# Patient Record
Sex: FEMALE | Race: WHITE
[De-identification: names, ages, dates, MRNs, and addresses within clinical notes are randomized per-mention and may not be internally consistent; named-entity substitution may affect disease eponyms.]

---

## 2017-06-02 ENCOUNTER — HOSPITAL ENCOUNTER (OUTPATIENT)
Dept: HOSPITAL 53 - M PAIN | Age: 62
End: 2017-06-02
Attending: ANESTHESIOLOGY
Payer: COMMERCIAL

## 2017-06-02 DIAGNOSIS — Z88.2: ICD-10-CM

## 2017-06-02 DIAGNOSIS — Z88.1: ICD-10-CM

## 2017-06-02 DIAGNOSIS — Z91.040: ICD-10-CM

## 2017-06-02 DIAGNOSIS — L23.0: ICD-10-CM

## 2017-06-02 DIAGNOSIS — Z88.0: ICD-10-CM

## 2017-06-02 DIAGNOSIS — J45.909: ICD-10-CM

## 2017-06-02 DIAGNOSIS — Z88.8: ICD-10-CM

## 2017-06-02 DIAGNOSIS — Z79.891: ICD-10-CM

## 2017-06-02 DIAGNOSIS — M47.816: ICD-10-CM

## 2017-06-02 DIAGNOSIS — E11.9: ICD-10-CM

## 2017-06-02 DIAGNOSIS — M19.90: ICD-10-CM

## 2017-06-02 DIAGNOSIS — Z91.018: ICD-10-CM

## 2017-06-02 DIAGNOSIS — G89.29: Primary | ICD-10-CM

## 2017-06-02 DIAGNOSIS — I10: ICD-10-CM

## 2017-06-02 DIAGNOSIS — Z79.899: ICD-10-CM

## 2017-06-02 DIAGNOSIS — M47.817: ICD-10-CM

## 2017-06-02 PROCEDURE — 64493 INJ PARAVERT F JNT L/S 1 LEV: CPT

## 2017-06-02 PROCEDURE — 64494 INJ PARAVERT F JNT L/S 2 LEV: CPT

## 2017-06-02 NOTE — REP
C-ARM VIEWS OF THE LUMBAR SPINE:

 

CLINICAL HISTORY:  Pain.

 

Two C-arm views of the lumbar spine are performed during injection by Dr. Gracia.  [place] are seen along the lower lumbar spine.  19 seconds of

fluoroscopy time is utilized for the procedure.

 

 

Signed by

Shon Kruse MD 06/02/2017 03:59 P

## 2017-06-07 NOTE — ECWPNPC
PATIENT NAME: JERRY MILLER

: 1955

GENDER: FEMALE

MRN: [id number]

VISIT DATE: 2017

DISCHARGE DATE: 17 1133

VISIT LOCKED DATE TIME: 

PHYSICIAN: PEDRO HERNÁNDEZ  

RESOURCE: PEDRO HERNÁNDEZ  

 

           

           

REASON FOR APPOINTMENT

           

          1. LFBD #2

           

HISTORY OF PRESENT ILLNESS

           

      HISTORY OF PRESENT ILLNESS:

      PAIN

           

           

          THE PATIENT DESCRIBES THE PAIN...

           

      FALL RISK SCREENING:

      SCREENING

           

           

          :NO FALLS IN THE PAST YEAR

           

CURRENT MEDICATIONS

           

          TAKING CYMBALTA 30 MG CAPSULE DELAYED RELEASE PARTICLES 1 CAPSULE

          ORALLY FOR PAIN TWICE A DAY MDD2, NOTES: 17

          TAKING GABAPENTIN 300 MG CAPSULE 1 CAPSULE ORALLY SPECIAL FUNDS

          TWO TIMES A DAY FOR PAINMDD2, NOTES: 17

          TAKING SKELAXIN 800 MG TABLET 1 TABLET ORALLY BID PRN, NOTES:

          900

          TAKING ALLOPURINOL 300 MG TABLET 1 TABLET ORALLY ONCE A DAY,

          NOTES: 17

          TAKING SINGULAIR 10 MG TABLET 1 TABLET IN THE EVENING ORALLY ONCE

          A DAY, NOTES: 17

          TAKING BENTYL 20 MG TABLET 1 TABLET ORALLY EVERY 4 HOURS AS

          NEEDED, NOTES: 17

          TAKING VENTOLIN  (90 BASE) MCG/ACT AEROSOL SOLUTION 2

          PUFFS INHALATION AS NEEDED, NOTES: 2100

          TAKING VITAMIN B COMPLEX CAPSULE AS DIRECTED ORALLY DAILY, NOTES:

          17

          TAKING HYDROCHLOROTHIAZIDE 25 25 MG TABLET AS DIRECTED ORAL

          DAILY, NOTES: 17

          TAKING LATANOPROST 0.005 % SOLUTION 1 DROP INTO BOTH EYES EVENING

          OPHTHALMIC ONCE A DAY, NOTES: 17

          TAKING SYMBICORT 80-4.5 MCG/ACT AEROSOL 2 PUFFS INHALATION TWICE

          A DAY, NOTES: 17

          TAKING PREMARIN 0.625 MG/GM CREAM VAGINAL THREE TIMES A WEEK,

          NOTES: 17

          TAKING TYLENOL 500 MG TABLET 1 TABLET AS NEEDED ORALLY EVERY 6

          HRS, NOTES: 17

          TAKING BENTYL 10 MG CAPSULE 1 CAPSULE ORALLY TWO TIMES A DAY,

          NOTES: 5-15-17 2200

          TAKING REFRESH CELLUVISC 1 % SOLUTION 1 DROP OU OPHTHALMIC 24

          TIME(S) A DAY, NOTES: 17

          TAKING DIFLUCAN 200 MG TABLET 1 TABLET ORALLY AS DIRECTED, NOTES:

          5-15-17 2200

          TAKING FIBER 58.6 % POWDER 2 CAPSULES WITH 8 OUNCES OF LIQUID

          ORALLY DAILY, NOTES: 17

          TAKING CRANBERRY 405 MG CAPSULE ORALLY DAILY, NOTES: 17

          TAKING TRAMADOL HCL 50 MG TABLET 1 TABLET AS NEEDED ORALLY Q8H

          PRN MDD3, NOTES: 17

          TAKING FLONASE ALLERGY RELIEF 50 MCG/ACT SUSPENSION 1 SPRAY IN

          EACH NOSTRIL NASALLY ONCE A DAY, NOTES: 17

          TAKING OXYBUTYNIN CHLORIDE 5 MG TABLET 2 TABLETS ORALLY 2 TIMES A

          DAY, NOTES: 17

          TAKING ALPHA LIPOIC ACID 100 MG CAPSULE 1 CAP ORALLY DAILY,

          NOTES: 17

          TAKING POTASSIUM CHLORIDE 10 MEQ CAPSULE EXTENDED RELEASE 1

          CAPSULE WITH FOOD ORALLY ONCE A DAY, NOTES: 17

          TAKING PROBIOTIC - CAPSULE ORALLY DAILY, NOTES: 17

          TAKING OMEPRAZOLE 20 MG CAPSULE DELAYED RELEASE 1 CAP ORALLY ONCE

          A DAY, NOTES: 17

          TAKING ALLERX 1 DROP OU BID, NOTES: 17

          NOT-TAKING XIIDRA 5 % SOLUTION 1 DROP INTO AFFECTED EYE

          OPHTHALMIC TWICE A DAY

          NOT-TAKING CABERGOLINE 0.5 MG TABLET 0.5 TABLET ORALLY TWICE A

          WEEK (TAKES 0.05 MG 1/2 TABLET), NOTES: MEDICATION DISCONTINUED

          NOT-TAKING MONOVISC 88 MG/4ML SOLUTION PREFILLED SYRINGE

          INTRA-ARTICULAR EVERY 6 MONTHS, NOTES: EVERY 6 MO (LAST 16)

          NOT-TAKING RHINOCORT AQUA 32 MCG/ACT SUSPENSION 1 SPRAY IN EACH

          NOSTRIL NASALLY AS NEEDED

          NOT-TAKING OXYBUTYNIN CHLORIDE ER 10 MG TABLET EXTENDED RELEASE

          24 HOUR 1 TABLET ORALLY BID

          NOT-TAKING ULTRAM 50 MG TABLET 1 TABLET AS NEEDED FOR PAIN ORALLY

          EVERY 6 HRS MDD1

          NOT-TAKING NASACORT ALLERGY 24HR 55 MCG/ACT AEROSOL 1 PUFF IN

          EACH NOSTRIL NASALLY ONCE A DAY

          NOT-TAKING CELEBREX 100 MG CAPSULE 1 CAPSULE ORALLY TWICE A DAY

          NOT-TAKING VITAMIN C 500 MG TABLET AS DIRECTED ORALLY DAILY,

          NOTES: 16 0900

          MEDICATION LIST REVIEWED AND RECONCILED WITH THE PATIENT

           

PAST MEDICAL HISTORY

           

          DIABETES

          BLOOD PRESSURE

          ASTHMA

          PITUITARY ADENOMA

          ARTHRITIS

          RUPTURED DISC/BACK PAIN

          IBS WITH DIARRHEA

          OPTIC DAMAGE DUE TO HYPERTENSION

          CHRONIC SINUSITIS

           

ALLERGIES

           

          PENICILLIN (FOR ALLERGIES USE ONLY): RASH: ALLERGY

          SULFA (FOR ALLERGY USE ONLY): RASH: ALLERGY

          PHENERGAN: BLOOD CLOT: SIDE EFFECTS

          LATEX CONDOMS, RUBBER BANDS, GLOVES: RASH: ALLERGY

          ERYTHROMYCIN: RASH: ALLERGY

          NICLKEL: RASH: ALLERGY

          SULFITES: DYSPNEA: ALLERGY

          WHEAT: GI UPSET, WATERY EYES AND SINUS CONGERSTION: SIDE EFFECTS

           

REVIEW OF SYSTEMS

           

      CONSTITUTIONAL:

           

          ANY CHANGE IN YOUR MEDICAL CONDITION? NO . CHILLS NO . FEVER NO .

           

      INFECTION:

           

          DO YOU HAVE NEW INFECTIONS? NO . DO YOU HAVE HISTORY OF MRSA? NO

          .

           

      MUSCULOSKELETAL:

           

          ANY NEW PATTERNS OF PAIN OR NUMBNESS? NO .

           

      GASTROENTEROLOGY:

           

          ANY NEW CHANGE IN BOWEL CONTROL? NO .

           

      GENITOURINARY:

           

          ANY NEW CHANGE IN BLADDER CONTROL? NO . IS THERE A CHANCE YOU

          COULD BE PREGNANT? NO .

           

      HEMATOLOGY/LYMPH:

           

          DO YOU TAKE ANY BLOOD THINNERS? (FOR EXAMPLE- COUMADIN, PLAVIX,

          AGGRENOX, PLATEL, PRADAXA, OR XARELTO) NO . WHEN WAS YOUR LAST

          DOSE? DATE: TIME: .

           

      NEUROLOGY:

           

          HAVE YOU FALLEN IN THE PAST 6 MONTHS? NO . ANY NEW EXTREMITY

          NUMBNESS OR WEAKNESS? NO .

           

      CARDIOLOGY:

           

          DO YOU HAVE A PACEMAKER OR DEFIBRILLATOR? NO .

           

      RESPIRATORY:

           

          HAVE YOU BEEN SICK IN THE PAST WEEK? NO . FEVER NO . FLU LIKE

          SYMPTOMS? NO . COUGH NO .

           

      INTEGUMENTARY:

           

          DO YOU HAVE ANY RASHES OR OPEN SORES? NO .

           

      ALLERGIC/IMMUNO:

           

          ARE YOU ALLERGIC TO SHELLFISH OR IV DYE? NO . ANY NEW ALLERGIES?

          NO .

           

      PSYCHIATRIC:

           

          DO YOU HAVE THOUGHTS OF HURTING YOURSELF OR SOMEONE ELSE? NO .

          ARE YOU ABUSED, NEGLECTED, OR IN AN UNSAFE ENVIRONMENT? NO .

           

      ENDOCRINOLOGY:

           

          ARE YOU DIABETIC? NO .

           

      OTHER:

           

          DO YOU NEED ANY PRESCRIPTIONS? NO . IF YES, PLEASE LIST: ____ .

          ANY NEW PROBLEMS WITH YOUR MEDICATIONS? NO . WHEN DID YOU LAST

          EAT? ____7 PM LAST NIGHT . WHEN DID YOU LAST DRINK? ____0800

          TODAY . WHAT DID YOU LAST DRINK? ____WATER . NAME OF PERSON

          DRIVING YOU HOME? ____MARK . DO YOU HAVE ANY OTHER QUESTIONS OR

          CONCERNS NO .

           

      REVIEWED BY:

           

          PROVIDER: _____ .

           

VITAL SIGNS

           

          .0 LBS, HT 65", BMI 38.27 INDEX, /60 MM HG, HR 69

          /MIN, RR 16 /MIN, TEMP 98.0 F, OXYGEN SAT % 98%, NA INITIALS TL

          0958.

           

ASSESSMENTS

           

          SPONDYLOSIS WITHOUT MYELOPATHY OR RADICULOPATHY, LUMBAR REGION -

          M47.816 (PRIMARY)

           

          SPONDYLOSIS WITHOUT MYELOPATHY OR RADICULOPATHY, LUMBOSACRAL

          REGION - M47.817

           

PROCEDURES

           

      PN WORKMANS' COMP OPINION

          IN YOUR OPINION, WAS THE INCIDENT THAT THE PATIENT DESCRIBED THE

          COMPETENT MEDICAL CAUSE OF THIS INJURY/ILLNESS? YES

          ARE THE PATIENT'S COMPLAINTS CONSISTENT WITH HIS/HER HISTORY OF

          THE INJURY/ILLNESS? YES

          IS THE PATIENT'S HISTORY OF THE INJURY/ILLNESS CONSISTENT WITH

          YOUR OBJECTIVE FINDING? YES

          WHAT IS THE PERCENTAGE OF TEMPORARY IMPAIRMENT? MODERATE TO

          MARKED = 66.7%

          IS THE PATIENT WORKING? YES

          DOCTOR ON SITE: PEDRO MILLER MD

      PN LUMBAR FACET BLOCK DIAGNOSTIC

          PRE PROCEDURE DIAGNOSIS LUMBAR SPONDYLOSIS, LUMBOSACRAL

          SPONDYLOSIS

          POST PROCEDURE DIAGNOSIS LUMBAR SPONDYLOSIS, LUMBOSACRAL

          SPONDYLOSIS

          PROCEDURE RIGHT L4-L5 AND L5-S1 FACET BLOCK DIAGNOSTIC NUMBER #2

          SURGEON DR. PEDRO HERNÁNDEZ

          ASSISTANT NONE

          ANESTHESIA LOCAL

          PRE PROCEDURE NOTE THE PATIENT WITH HISTORY OF CHRONIC LOW BACK

          PAIN. I EVALUATED THE PATIENT AND REVIEWED THE CHART. I WENT OVER

          THE RISKS, ALTERNATIVES, AND BENEFITS ASSOCIATED WITH THIS

          PROCEDURE. THE PATIENT WOULD LIKE TO PROCEED AND GAVE CONSENT TO

          PERFORM THE PROCEDURE. AS AGREED WITH THE PATIENT WE ARE DOING

          THIS PROCEDURE TO DETERMINE IF THE PATIENT IS A CANDIDATE FOR A

          RADIOFREQUENCY ABLATION OF THE FACETS JOINTS. THE PATIENT DENIES

          UNEXPLAINABLE WEIGHT LOSS, FEVER, CHILLS, OR NEW CHANGES IN

          URINARY OR BOWEL CONTROL

          DESCRIPTION OF PROCEDURE THE PATIENT WAS BROUGHT TO THE PROCEDURE

          ROOM AND PLACED IN THE PRONE POSITION. THE LUMBOSACRAL AREA WAS

          CLEANED WITH CHLORAPREP SOLUTION AND DRAPED ASEPTICALLY. THE

          PROCEDURE WAS DONE UNDER STERILE CONDITIONS. I CHECKED LATERALITY

          AND THE LEVEL WHERE THE PROCEDURE WAS GOING TO BE PERFORMED WITH

          THE PATIENT AND THE SUPPORTING STAFF AT THE MOMENT OF THE TIME

          OUT IN THE PROCEDURE ROOM. UNDER FLUOROSCOPIC GUIDANCE, TARGETS

          WERE SELECTED AT THE INTERSECTION OF THE RIGHT TRANSVERSE PROCESS

          OF L4, L5 AND ALA OF S1 WITH ITS RESPECTIVE SUPERIOR ARTICULAR

          PROCESS. LIDOCAINE WAS USED TO NUMB THE SKIN AND THE SUBCUTANEOUS

          TISSUE BELOW IT. SPINAL NEEDLE, 22-GAUGE WAS ADVANCED UNDER

          FLUOROSCOPIC GUIDANCE AND FOLLOWING PATIENT FEEDBACK UNTIL THE

          TARGETS WERE REACHED. POSITION OF THE NEEDLES WAS VERIFIED WITH

          AP AND LATERAL VIEWS. AFTER PROPER POSITION OF THE NEEDLES WAS

          ACHIEVED, ISOVUE-M DYE 30% 0.1 ML WAS INJECTED AT EACH SITE

          SHOWING ADEQUATE SPREAD OF THE DYE. THEN A SOLUTION OF 0.4 ML OF

          BUPIVACAINE 0.25% WAS INJECTED AT EACH SITE. THERE WAS NO

          EVIDENCE OF BLOOD, PARESTHESIA OR CEREBROSPINAL FLUID DURING THE

          PROCEDURE. THE PATIENT WAS SENT TO THE RECOVERY ROOM. THE PATIENT

          WAS MOVING THE EXTREMITIES AND DOING WELL. THERE WAS NO

          COMPLICATION DURING THE PROCEDURE. FLUOROSCOPY TIME WAS 19

          SECONDS

          POST PROCEDURE NOTE THE PATIENT WILL DOCUMENT HIS PAIN LEVEL AND

          RESPONSE TO THIS PROCEDURE EVERY 30 MINUTES. THE PATIENT WILL BE

          SEEN IN A FOLLOW UP IN THE NEXT FEW WEEKS. FURTHER DETERMINATION

          FOR HIS CASE WILL BE DONE AT THE NEXT VISIT. INSTRUCTIONS WERE

          GIVEN, QUESTIONS WERE ANSWERED, AND THE PATIENT EXPRESSED

          UNDERSTANDING AND AGREED WITH THE PLAN. I, SANDY UREÑA,

          DOCUMENTED THE ABOVE INFORMATION ACTING AS A SCRIBE FOR DR. HERNÁNDEZ. I HAVE REVIEWED THE ABOVE DOCUMENT, WRITTEN BY SANDY UREÑA SCRIBE AND I VERIFY THAT IT IS ACCURATE

           

DIAGNOSTIC IMAGING

           

          [facility] FACET BLOCK (PAIN)[id number]

           

PROCEDURE CODES

           

          47793 INJ PARAVERT F JNT L/S 1 LEV

           

          29471 INJ PARAVERT F JNT L/S 2 LEV

           

          6045F RADXPS IN END [id number] PXD

           

DISPOSITION & COMMUNICATION

           

FOLLOW UP

           

          3 WEEKS

           

 

ELECTRONICALLY SIGNED BY PEDRO HERNÁNDEZ MD ON

          2017 AT 06:56 PM EDT

           

           

           

 

DISCLAIMER :

THIS IS A VISIT SUMMARY EXTRACTED FROM THE [employer] CHART.

IT IS NOT A COPY OF THE [employer] PROGRESS NOTE.

MTDD

## 2017-06-16 ENCOUNTER — HOSPITAL ENCOUNTER (OUTPATIENT)
Dept: HOSPITAL 53 - M PAIN | Age: 62
End: 2017-06-16
Attending: ANESTHESIOLOGY
Payer: COMMERCIAL

## 2017-06-16 DIAGNOSIS — M47.817: ICD-10-CM

## 2017-06-16 DIAGNOSIS — M54.5: ICD-10-CM

## 2017-06-16 DIAGNOSIS — Z88.1: ICD-10-CM

## 2017-06-16 DIAGNOSIS — Z79.899: ICD-10-CM

## 2017-06-16 DIAGNOSIS — M19.90: ICD-10-CM

## 2017-06-16 DIAGNOSIS — Z88.0: ICD-10-CM

## 2017-06-16 DIAGNOSIS — Z91.018: ICD-10-CM

## 2017-06-16 DIAGNOSIS — Z88.2: ICD-10-CM

## 2017-06-16 DIAGNOSIS — L23.0: ICD-10-CM

## 2017-06-16 DIAGNOSIS — G89.29: Primary | ICD-10-CM

## 2017-06-16 DIAGNOSIS — E11.9: ICD-10-CM

## 2017-06-16 DIAGNOSIS — J45.909: ICD-10-CM

## 2017-06-16 DIAGNOSIS — Z91.040: ICD-10-CM

## 2017-06-16 DIAGNOSIS — M47.816: ICD-10-CM

## 2017-06-19 ENCOUNTER — HOSPITAL ENCOUNTER (OUTPATIENT)
Dept: HOSPITAL 53 - M ADMPAT | Age: 62
End: 2017-06-19
Attending: ORTHOPAEDIC SURGERY
Payer: COMMERCIAL

## 2017-06-19 VITALS — SYSTOLIC BLOOD PRESSURE: 139 MMHG | DIASTOLIC BLOOD PRESSURE: 78 MMHG

## 2017-06-19 DIAGNOSIS — Z18.89: ICD-10-CM

## 2017-06-19 DIAGNOSIS — M17.9: Primary | ICD-10-CM

## 2017-06-19 LAB
ALBUMIN SERPL BCG-MCNC: 3.9 GM/DL (ref 3.2–5.2)
ALBUMIN/GLOB SERPL: 1.44 {RATIO} (ref 1–1.93)
ALP SERPL-CCNC: 77 U/L (ref 45–117)
ALT SERPL W P-5'-P-CCNC: 30 U/L (ref 12–78)
ANION GAP SERPL CALC-SCNC: 6 MEQ/L (ref 8–16)
AST SERPL-CCNC: 19 U/L (ref 15–37)
BILIRUB SERPL-MCNC: 0.3 MG/DL (ref 0.2–1)
BUN SERPL-MCNC: 10 MG/DL (ref 7–18)
CALCIUM SERPL-MCNC: 9.9 MG/DL (ref 8.8–10.2)
CHLORIDE SERPL-SCNC: 106 MEQ/L (ref 98–107)
CO2 SERPL-SCNC: 31 MEQ/L (ref 21–32)
CREAT SERPL-MCNC: 0.79 MG/DL (ref 0.55–1.02)
ERYTHROCYTE [DISTWIDTH] IN BLOOD BY AUTOMATED COUNT: 13.2 % (ref 11.5–14.5)
ERYTHROCYTE [SEDIMENTATION RATE] IN BLOOD BY WESTERGREN METHOD: 16 MM/HR (ref 0–30)
GFR SERPL CREATININE-BSD FRML MDRD: > 60 ML/MIN/{1.73_M2} (ref 45–?)
GLUCOSE SERPL-MCNC: 118 MG/DL (ref 80–110)
INR PPP: 1.04
MCH RBC QN AUTO: 31.9 PG (ref 27–33)
MCHC RBC AUTO-ENTMCNC: 33.6 G/DL (ref 32–36.5)
MCV RBC AUTO: 95.1 FL (ref 80–96)
PLATELET # BLD AUTO: 387 K/MM3 (ref 150–450)
POTASSIUM SERPL-SCNC: 4.1 MEQ/L (ref 3.5–5.1)
PROT SERPL-MCNC: 6.6 GM/DL (ref 6.4–8.2)
SODIUM SERPL-SCNC: 143 MEQ/L (ref 136–145)
WBC # BLD AUTO: 4.9 K/MM3 (ref 4–10)

## 2017-06-20 NOTE — ECGEPIP
Stationary ECG Study

                              

                                       

                                       Test Date:    2017

Pat Name:     JERRY MILLER           Department:   

Patient ID:   [id number]                 Room:         -

Gender:       F                        Technician:   LU

:          1955               Requested By: LINDA Son

Order Number: [id number]     Reading MD:   Ashok King

                                 Measurements

Intervals                              Axis          

Rate:         64                       P:            17

MA:           154                      QRS:          15

QRSD:         100                      T:            15

QT:           410                                    

QTc:          425                                    

                           Interpretive Statements

SINUS RHYTHM

INFERIOR MYOCARDIAL INFARCTION, PROBABLY OLD

NO PRIOR TRACING IN THE SYSTEM

Electronically Signed On 2017 0:33:01 EDT by Ashok King

## 2017-06-20 NOTE — REP
Clinical:  Sleep apnea .

 

Comparison: 01/20/2014 .

 

Technique:  PA and lateral.

 

Findings:

The mediastinum and cardiac silhouette are normal.  The lung fields are clear and

without acute consolidation, effusion, or pneumothorax.  The skeletal structures

are intact and normal.

 

Impression:

1.   No acute cardiopulmonary process.

 

 

Signed by

Dejuan Padilla MD 06/20/2017 12:36 A

## 2017-06-22 NOTE — HPE
DATE OF ADMISSION:   06/26/2017

 

REASON FOR ADMISSION:  Left knee pain and stiffness.

 

ATTENDING PHYSICIAN:  Dr. Huy Silva

 

HISTORY OF PRESENT ILLNESS:   This is a pleasant 62-year-old female patient who

comes in for her persistent pain in her left knee.  She has pain with

weightbearing activities and activities of daily living.  She has elected for

surgery for continued symptoms.  She has consented for a left total knee

arthroplasty by Dr. Silva.  X-rays of her left knee are notable for

end-stage degenerative changes of the left knee, as well as Interferon screw

consistent with anterior cruciate ligament (ACL)  reconstruction.

 

ALLERGIES:  PENICILLIN, SULFA DRUGS, PHENERGAN, ERYTHROMYCIN, LATEX, NICKEL,

SULFITES.

 

MEDICAL HISTORY:  Includes asthma, hypertension, chronic urinary incontinence,

history of a pituitary adenoma, chronic back pain, as well as history of gout.

 

SURGICAL HISTORY:  Includes eye surgery, tubal ligation, ACL reconstruction of

her left knee, rotator cuff repair and gallbladder removed.

 

CURRENT MEDICATIONS:

- allopurinol 300 mg one tablet once per day

- Singulair 10 mg one  tablet once a day

- hydrochlorothiazide 25 mg one tablet once per day

- Proventil inhaler two puffs as needed

- Cymbalta 60 mg one tablet twice per day

- Tylenol as needed for pain

- Symbicort one puff two times a day

- Skelaxin 100 mg one tablet three times a day as needed

- potassium 10 mEq one tablet in the morning

- tramadol as needed for pain

- cabergoline 0.5 mg half a tab twice weekly Tuesdays and Fridays

- lipoic acid 200 mg three tablets twice a day

- Aleve as needed, she will discontinue that 5 days prior to surgery

- daily multivitamin

 

FAMILY HISTORY:  Noncontributory.

 

REVIEW OF SYSTEMS:  Denies fever or chills.  Denies chest pain, shortness breath

or cough.  Denies difficulty breathing.  Denies abdominal pain.  Denies nausea or

vomiting.  Notes persistent pain in her left knee with weightbearing activities.

Denies recent upper respiratory infection or urinary tract infection (UTI)

symptoms.

 

PHYSICAL EXAMINATION:

Exam today reveals alert female patient.  She walks with a limping gait.  She

favors her left side.  She uses a cane for ambulation.  She has a normal mood and

affect.  Exam of the left knee reveals skin to be intact.  There is a 1 cm x 1/2

cm healing abrasion to the distal one third of the lateral shin without gross

signs of infection.  The leg is intact to light touch.  Dorsalis pedis, posterior

tibialis pulses are palpable.  There is irritability on knee range of motion.

Tenderness mainly medially.  Neck is supple without adenopathy or jugular venous

distention (JVD).

LUNGS:   Clear to auscultation without rales or wheeze.

HEART:  Regular rate and rhythm.

ABDOMEN:  Bowel sounds are present.

Height 64 inches, weight 234 pounds, temperature 97.2, blood pressure 120/80,

pulse 70, respirations 18.

 

LABORATORY DATA

Chest x-ray with acute cardiopulmonary process noted.

 

EKG sinus rhythm.

 

Urine culture contaminated.  Nasal culture normal gagan.  Urinalysis within

normal limits.

Sed rate 16, PT 13.7, INR 1.04, glucose 118, BUN 10, creatinine 0.79, sodium 143,

potassium 4.1, WBC count of 4.9, RBC count of 4.6, hemoglobin 14.8, hematocrit

44.0.

 

IMPRESSION:  Symptomatic osteoarthritis of the left knee.

 

PLAN:  Consented for left total knee arthroplasty  by Dr. Silva.

## 2017-06-24 NOTE — ECWPNPC
PATIENT NAME: JERRY MILLER

: 1955

GENDER: FEMALE

MRN: [id number]

VISIT DATE: 2017

DISCHARGE DATE: 17 1429

VISIT LOCKED DATE TIME: 

PHYSICIAN: PEDRO HERNÁNDEZ  

RESOURCE: PEDRO HERNÁNDEZ  

 

           

           

REASON FOR APPOINTMENT

           

          1. LOW BACK PAIN W/C

           

HISTORY OF PRESENT ILLNESS

           

      HISTORY OF PRESENT ILLNESS:

      PAIN

           

           

          THE PATIENT DESCRIBES THE PAIN...

           

          61 YEAR OLD FEMALE PATIENT WITH HISTORY OF CHRONIC LOW BACK PAIN.

          PATIENT DESCRIBES THE PAIN AS ACHING, STABBING, TENDER,

          THROBBING, SORE, SHOOTING, AND HAVING IT ALL THE TIME ON THE LEFT

          LOWER BACK WITH A PAIN SCORE OF 7/10. PATIENT WAS INJURED IN A

          WORK RELATED INJURY ON 2005 WORKING FOR [employer], PATIENT WAS HELPING A CLIENT ONTO THE TOILET

          WHEN SHE INJURED HER BACK. MRS. MILLER RECEIVED A DIAGNOSTIC

          FACET BLOCK ON 17 AND REPORTS HAVING OVER 50% RELIEF IN PAIN

          AND INCREASED MOBILITY AND FUNCTIONALITY FOR OVER 12 HOURS.

          CURRENTLY THE PATIENT IS USING GABAPENTIN, SKELAXIN, CYMBALTA,

          AND TRAMADOL AND STATES THAT THE MEDICATION KEEPS HER MOBILE AND

          FUNCTIONAL. PATIENT DENIES UNEXPLAINABLE WEIGHT LOSS, FEVER,

          CHILLS, NEW CHANGES ON HER URINARY OR BOWEL CONTROL.

           

      FALL RISK SCREENING:

      SCREENING

           

           

          :NO FALLS IN THE PAST YEAR

           

CURRENT MEDICATIONS

           

          TAKING CYMBALTA 30 MG CAPSULE DELAYED RELEASE PARTICLES 1 CAPSULE

          ORALLY FOR PAIN TWICE A DAY MDD2

          TAKING GABAPENTIN 300 MG CAPSULE 1 CAPSULE ORALLY [facility]

          TWO TIMES A DAY FOR PAINMDD2

          TAKING SKELAXIN 800 MG TABLET 1 TABLET ORALLY BID PRN

          TAKING ALLOPURINOL 300 MG TABLET 1 TABLET ORALLY ONCE A DAY

          TAKING SINGULAIR 10 MG TABLET 1 TABLET IN THE EVENING ORALLY ONCE

          A DAY

          TAKING BENTYL 20 MG TABLET 1 TABLET ORALLY EVERY 4 HOURS AS

          NEEDED

          TAKING VENTOLIN  (90 BASE) MCG/ACT AEROSOL SOLUTION 2

          PUFFS INHALATION AS NEEDED

          TAKING VITAMIN B COMPLEX CAPSULE AS DIRECTED ORALLY DAILY

          TAKING HYDROCHLOROTHIAZIDE 25 25 MG TABLET AS DIRECTED ORAL DAILY

          TAKING LATANOPROST 0.005 % SOLUTION 1 DROP INTO BOTH EYES EVENING

          OPHTHALMIC ONCE A DAY

          TAKING SYMBICORT 80-4.5 MCG/ACT AEROSOL 2 PUFFS INHALATION TWICE

          A DAY

          TAKING PREMARIN 0.625 MG/GM CREAM VAGINAL THREE TIMES A WEEK

          TAKING TYLENOL 500 MG TABLET 1 TABLET AS NEEDED ORALLY EVERY 6

          HRS

          TAKING BENTYL 10 MG CAPSULE 1 CAPSULE ORALLY TWO TIMES A DAY

          TAKING REFRESH CELLUVISC 1 % SOLUTION 1 DROP OU OPHTHALMIC 24

          TIME(S) A DAY

          TAKING DIFLUCAN 200 MG TABLET 1 TABLET ORALLY AS DIRECTED

          TAKING FIBER 58.6 % POWDER 2 CAPSULES WITH 8 OUNCES OF LIQUID

          ORALLY DAILY

          TAKING CRANBERRY 405 MG CAPSULE ORALLY DAILY

          TAKING TRAMADOL HCL 50 MG TABLET 1 TABLET AS NEEDED ORALLY Q8H

          PRN MDD3

          TAKING FLONASE ALLERGY RELIEF 50 MCG/ACT SUSPENSION 1 SPRAY IN

          EACH NOSTRIL NASALLY ONCE A DAY

          TAKING OXYBUTYNIN CHLORIDE 5 MG TABLET 2 TABLETS ORALLY 2 TIMES A

          DAY

          TAKING ALPHA LIPOIC ACID 100 MG CAPSULE 1 CAP ORALLY DAILY

          TAKING POTASSIUM CHLORIDE 10 MEQ CAPSULE EXTENDED RELEASE 1

          CAPSULE WITH FOOD ORALLY ONCE A DAY

          TAKING PROBIOTIC - CAPSULE ORALLY DAILY

          TAKING OMEPRAZOLE 20 MG CAPSULE DELAYED RELEASE 1 CAP ORALLY ONCE

          A DAY

          TAKING ALLERX 1 DROP OU BID

          NOT-TAKING XIIDRA 5 % SOLUTION 1 DROP INTO AFFECTED EYE

          OPHTHALMIC TWICE A DAY

          NOT-TAKING CABERGOLINE 0.5 MG TABLET 0.5 TABLET ORALLY TWICE A

          WEEK (TAKES 0.05 MG 1/2 TABLET), NOTES: MEDICATION DISCONTINUED

          NOT-TAKING MONOVISC 88 MG/4ML SOLUTION PREFILLED SYRINGE

          INTRA-ARTICULAR EVERY 6 MONTHS, NOTES: EVERY 6 MO (LAST 16)

          NOT-TAKING RHINOCORT AQUA 32 MCG/ACT SUSPENSION 1 SPRAY IN EACH

          NOSTRIL NASALLY AS NEEDED

          NOT-TAKING OXYBUTYNIN CHLORIDE ER 10 MG TABLET EXTENDED RELEASE

          24 HOUR 1 TABLET ORALLY BID

          NOT-TAKING ULTRAM 50 MG TABLET 1 TABLET AS NEEDED FOR PAIN ORALLY

          EVERY 6 HRS MDD1

          NOT-TAKING NASACORT ALLERGY 24HR 55 MCG/ACT AEROSOL 1 PUFF IN

          EACH NOSTRIL NASALLY ONCE A DAY

          NOT-TAKING CELEBREX 100 MG CAPSULE 1 CAPSULE ORALLY TWICE A DAY

          NOT-TAKING VITAMIN C 500 MG TABLET AS DIRECTED ORALLY DAILY,

          NOTES: 16 0900

          MEDICATION LIST REVIEWED AND RECONCILED WITH THE PATIENT

           

PAST MEDICAL HISTORY

           

          DIABETES

          BLOOD PRESSURE

          ASTHMA

          PITUITARY ADENOMA

          ARTHRITIS

          RUPTURED DISC/BACK PAIN

          IBS WITH DIARRHEA

          OPTIC DAMAGE DUE TO HYPERTENSION

          CHRONIC SINUSITIS

           

ALLERGIES

           

          PENICILLIN (FOR ALLERGIES USE ONLY): RASH: ALLERGY

          SULFA (FOR ALLERGY USE ONLY): RASH: ALLERGY

          PHENERGAN: BLOOD CLOT: SIDE EFFECTS

          LATEX CONDOMS, RUBBER BANDS, GLOVES: RASH: ALLERGY

          ERYTHROMYCIN: RASH: ALLERGY

          NICLKEL: RASH: ALLERGY

          SULFITES: DYSPNEA: ALLERGY

          WHEAT: GI UPSET, WATERY EYES AND SINUS CONGERSTION: SIDE EFFECTS

           

REVIEW OF SYSTEMS

           

      REVIEWED BY:

           

          PROVIDER: _____ .

           

      CONSTITUTIONAL:

           

          ANY CHANGE IN YOUR MEDICAL CONDITION? HAVING TOTAL KNEE ON  . CHILLS NO . FEVER NO .

           

      INFECTION:

           

          DO YOU HAVE NEW INFECTIONS? NO . DO YOU HAVE HISTORY OF MRSA? NO

          .

           

      MUSCULOSKELETAL:

           

          ANY NEW PATTERNS OF PAIN OR NUMBNESS? NO .

           

      GASTROENTEROLOGY:

           

          ANY NEW CHANGE IN BOWEL CONTROL? NO .

           

      GENITOURINARY:

           

          ANY NEW CHANGE IN BLADDER CONTROL? NO . IS THERE A CHANCE YOU

          COULD BE PREGNANT? NO .

           

      HEMATOLOGY/LYMPH:

           

          DO YOU TAKE ANY BLOOD THINNERS? (FOR EXAMPLE- COUMADIN, PLAVIX,

          AGGRENOX, PLATEL, PRADAXA, OR XARELTO) NO . WHEN WAS YOUR LAST

          DOSE? DATE: TIME: .

           

      NEUROLOGY:

           

          HAVE YOU FALLEN IN THE PAST 6 MONTHS? NO . ANY NEW EXTREMITY

          NUMBNESS OR WEAKNESS? NO .

           

      CARDIOLOGY:

           

          DO YOU HAVE A PACEMAKER OR DEFIBRILLATOR? NO .

           

      RESPIRATORY:

           

          HAVE YOU BEEN SICK IN THE PAST WEEK? NO . FEVER NO . FLU LIKE

          SYMPTOMS? NO . COUGH NO .

           

      INTEGUMENTARY:

           

          DO YOU HAVE ANY RASHES OR OPEN SORES? NO .

           

      ALLERGIC/IMMUNO:

           

          ARE YOU ALLERGIC TO SHELLFISH OR IV DYE? NO . ANY NEW ALLERGIES?

          NO .

           

      PSYCHIATRIC:

           

          DO YOU HAVE THOUGHTS OF HURTING YOURSELF OR SOMEONE ELSE? NO .

          ARE YOU ABUSED, NEGLECTED, OR IN AN UNSAFE ENVIRONMENT? NO .

           

      ENDOCRINOLOGY:

           

          ARE YOU DIABETIC? NO .

           

      OTHER:

           

          DO YOU NEED ANY PRESCRIPTIONS? NO . IF YES, PLEASE LIST: ____ .

          ANY NEW PROBLEMS WITH YOUR MEDICATIONS? NO . WHEN DID YOU LAST

          EAT? ____ . WHEN DID YOU LAST DRINK? ____ . WHAT DID YOU LAST

          DRINK? ____ . NAME OF PERSON DRIVING YOU HOME? ____ . DO YOU HAVE

          ANY OTHER QUESTIONS OR CONCERNS NO .

           

VITAL SIGNS

           

           LBS, HT 65", BMI 39.27 INDEX, /71 MM HG, HR 79 /MIN,

          RR 16 /MIN, TEMP 96.7 F, OXYGEN SAT % 95%, NA INITIALS AW 1334.

           

EXAMINATION

           

       : PATIENT IS ALERT O X 3 AND COOPERATIVE. ANTALGIC

          GAIT. TENDERNESS IN THE LOWER BACK AND PARASPINAL MUSCLE GROUP

          MOSTLY THE RIGHT SIDE. PATIENT IS ABLE TO EXTEND HER BACK AT 15

          DEGREES WITH PAIN AND DISCOMFORT, PATIENT IS FLEXIBLE IS FLEXING

          HER BACK. MRI DONE ON 16 OF THE LUMBAR SPINE SHOWS DISC

          BULGES AT L2-L3 AND L3-L4 ALONG WITH STENOSIS AT L3-L4.

           

ASSESSMENTS

           

          SPONDYLOSIS WITHOUT MYELOPATHY OR RADICULOPATHY, LUMBAR REGION -

          M47.816 (PRIMARY)

           

          LOW BACK PAIN OF OVER 3 MONTHS DURATION - M54.5

           

          SPONDYLOSIS WITHOUT MYELOPATHY OR RADICULOPATHY, LUMBOSACRAL

          REGION - M47.817

           

TREATMENT

           

      SPONDYLOSIS WITHOUT MYELOPATHY OR RADICULOPATHY,

          LUMBAR REGION

          NOTES: WE DISCUSSED SEVERAL ISSUES WITH MRS. MILLER'S PAIN

          MANAGEMENT CASE. AT THIS TIME THE PATIENT WILL CONTINUE WITH THE

          SAME MEDICATION REGIME AS BEFORE. PATIENT WILL USE THE CYMBALTA

          FOR THE NEUROPATHIC PAIN, GABAPENTIN FOR THE NEUROPATHIC PAIN,

          TRAMADOL FOR THE SOMATIC PAIN, AND SKELAXIN FOR THE MUSCLE

          SPASMS. PATIENT DENIES ABUSE OF ANY MEDICATION, DENIES USE OF

          ILLEGAL SUBSTANCES, AND STATES THAT SHE IS ONLY USING THE

          MEDICATION FOR PAIN MANAGEMENT. PATIENT WILL PREFORM A URINE

          TOXICOLOGY REPORT AT TODAY'S VISIT. PATIENT HAS HAD TWO

          DIAGNOSTIC TEST FOR A RADIOFREQUENCY AND BOTH TESTS HAVE HAD

          ADEQUATE RESULTS TO MOVE FORWARD WITH A RADIOFREQUENCY. WE

          DISCUSSED THE RISKS, BENENFITS, AND ALTNERATIVES OF THE INJECTION

          AND THE PATIENT WOULD LIKE TO PROCEED AT THIS TIME. INSTRUCTIONS

          WERE GIVEN, QUESTIONS WERE ANSWERED, PATIENT REPORTS

          UNDERSTANDING AND AGREES WITH THE PLAN. I, ANMOL DE LOS SANTOS,

          DOCUMENTED THE ABOVE INFORMATION ACTING AS A SCRIBE FOR DR. HERNÁNDEZ. I HAVE REVIEWED THE ABOVE DOCUMENT, WRITTEN BY ANMOL VILLEGAS AND I VERIFY THAT IT IS ACCURATE.

           

           

      LOW BACK PAIN OF OVER 3 MONTHS DURATION

          REFILL TRAMADOL HCL TABLET, 50 MG, 1 TABLET AS NEEDED, ORALLY,

          Q8H PRN MDD3, 30 DAY(S), 45, REFILLS 2

           

           

      OTHERS

          REFILL CYMBALTA CAPSULE DELAYED RELEASE PARTICLES, 30 MG, 1

          CAPSULE, ORALLY FOR PAIN, TWICE A DAY MDD2, 30 DAY(S), 60,

          REFILLS 2

          REFILL GABAPENTIN CAPSULE, 300 MG, 1 CAPSULE, ORALLY SPECIAL

          FUNDS, TWO TIMES A DAY FOR PAINMDD2, 30 DAY(S), 60, REFILLS 2

          REFILL SKELAXIN TABLET, 800 MG, 1 TABLET, ORALLY, BID PRN FOR

          SPASMS AND PAIN MDD 2, 30 DAY(S), 55, REFILLS 2

          START ACETAMINOPHEN TABLET, 500 MG, 2 TABLETS AS NEEDED, ORALLY,

          EVERY 6 HRS FOR PAIN MDD4, 30 DAY(S), 120, REFILLS 2

           

PROCEDURES

           

      PN WORKMANS' COMP OPINION

          IN YOUR OPINION, WAS THE INCIDENT THAT THE PATIENT DESCRIBED THE

          COMPETENT MEDICAL CAUSE OF THIS INJURY/ILLNESS? YES

          ARE THE PATIENT'S COMPLAINTS CONSISTENT WITH HIS/HER HISTORY OF

          THE INJURY/ILLNESS? YES

          IS THE PATIENT'S HISTORY OF THE INJURY/ILLNESS CONSISTENT WITH

          YOUR OBJECTIVE FINDING? YES

          WHAT IS THE PERCENTAGE OF TEMPORARY IMPAIRMENT? MODERATE TO

          MARKED = 66.7%

          IS THE PATIENT WORKING? NO

          DOCTOR ON SITE: PEDRO MILLER MD

           

PREVENTIVE MEDICINE

           

          GAVE INFO ON PREPROCEDURE CARE (PLANNING TO DO THIS AFTER HER

          KNEE OPERATION) / SHE EXPRESSES UNDERSTANDING ON CARE AS FROM

          PRIOR PROCEDURES SHE HAS HAD.

           

PROCEDURE CODES

           

           ESTABILISHED PATIENT [facility] FACILITY CHARGE

           

           DOC MEDS VERIFIED W/PT OR RE

           

           PAIN ASSESS POS TOOL F/U PLAN DOC

           

DISPOSITION & COMMUNICATION

           

FOLLOW UP

           

          RF AFTER APPROVAL

           

 

ELECTRONICALLY SIGNED BY PEDRO HERNÁNDEZ MD ON

          2017 AT 08:28 AM EDT

           

           

           

 

DISCLAIMER :

THIS IS A VISIT SUMMARY EXTRACTED FROM THE [employer] CHART.

IT IS NOT A COPY OF THE [employer] PROGRESS NOTE.

AGUSTINA

## 2017-06-26 ENCOUNTER — HOSPITAL ENCOUNTER (INPATIENT)
Dept: HOSPITAL 53 - M OR | Age: 62
LOS: 2 days | Discharge: HOME HEALTH | DRG: 470 | End: 2017-06-28
Attending: ORTHOPAEDIC SURGERY | Admitting: ORTHOPAEDIC SURGERY
Payer: COMMERCIAL

## 2017-06-26 VITALS — HEIGHT: 65 IN | WEIGHT: 235 LBS | BODY MASS INDEX: 39.15 KG/M2

## 2017-06-26 VITALS — SYSTOLIC BLOOD PRESSURE: 115 MMHG | DIASTOLIC BLOOD PRESSURE: 55 MMHG

## 2017-06-26 VITALS — SYSTOLIC BLOOD PRESSURE: 125 MMHG | DIASTOLIC BLOOD PRESSURE: 76 MMHG

## 2017-06-26 VITALS — SYSTOLIC BLOOD PRESSURE: 109 MMHG | DIASTOLIC BLOOD PRESSURE: 56 MMHG

## 2017-06-26 VITALS — SYSTOLIC BLOOD PRESSURE: 116 MMHG | DIASTOLIC BLOOD PRESSURE: 61 MMHG

## 2017-06-26 VITALS — DIASTOLIC BLOOD PRESSURE: 57 MMHG | SYSTOLIC BLOOD PRESSURE: 117 MMHG

## 2017-06-26 VITALS — SYSTOLIC BLOOD PRESSURE: 127 MMHG | DIASTOLIC BLOOD PRESSURE: 60 MMHG

## 2017-06-26 DIAGNOSIS — J45.909: ICD-10-CM

## 2017-06-26 DIAGNOSIS — Z88.2: ICD-10-CM

## 2017-06-26 DIAGNOSIS — G89.29: ICD-10-CM

## 2017-06-26 DIAGNOSIS — Z79.899: ICD-10-CM

## 2017-06-26 DIAGNOSIS — M17.12: Primary | ICD-10-CM

## 2017-06-26 DIAGNOSIS — M10.9: ICD-10-CM

## 2017-06-26 DIAGNOSIS — Z88.0: ICD-10-CM

## 2017-06-26 DIAGNOSIS — Z88.8: ICD-10-CM

## 2017-06-26 DIAGNOSIS — I10: ICD-10-CM

## 2017-06-26 DIAGNOSIS — Z88.1: ICD-10-CM

## 2017-06-26 DIAGNOSIS — Z91.040: ICD-10-CM

## 2017-06-26 DIAGNOSIS — E87.6: ICD-10-CM

## 2017-06-26 DIAGNOSIS — H40.9: ICD-10-CM

## 2017-06-26 DIAGNOSIS — N32.81: ICD-10-CM

## 2017-06-26 PROCEDURE — 0QPH04Z REMOVAL OF INTERNAL FIXATION DEVICE FROM LEFT TIBIA, OPEN APPROACH: ICD-10-PCS | Performed by: ORTHOPAEDIC SURGERY

## 2017-06-26 PROCEDURE — 0SRD0J9 REPLACEMENT OF LEFT KNEE JOINT WITH SYNTHETIC SUBSTITUTE, CEMENTED, OPEN APPROACH: ICD-10-PCS | Performed by: ORTHOPAEDIC SURGERY

## 2017-06-26 RX ADMIN — SODIUM CHLORIDE, POTASSIUM CHLORIDE, SODIUM LACTATE AND CALCIUM CHLORIDE SCH MLS/HR: 600; 310; 30; 20 INJECTION, SOLUTION INTRAVENOUS at 15:55

## 2017-06-27 VITALS — DIASTOLIC BLOOD PRESSURE: 55 MMHG | SYSTOLIC BLOOD PRESSURE: 112 MMHG

## 2017-06-27 VITALS — DIASTOLIC BLOOD PRESSURE: 72 MMHG | SYSTOLIC BLOOD PRESSURE: 139 MMHG

## 2017-06-27 VITALS — DIASTOLIC BLOOD PRESSURE: 61 MMHG | SYSTOLIC BLOOD PRESSURE: 109 MMHG

## 2017-06-27 VITALS — SYSTOLIC BLOOD PRESSURE: 111 MMHG | DIASTOLIC BLOOD PRESSURE: 58 MMHG

## 2017-06-27 VITALS — SYSTOLIC BLOOD PRESSURE: 125 MMHG | DIASTOLIC BLOOD PRESSURE: 62 MMHG

## 2017-06-27 LAB
ANION GAP SERPL CALC-SCNC: 5 MEQ/L (ref 8–16)
BUN SERPL-MCNC: 11 MG/DL (ref 7–18)
CALCIUM SERPL-MCNC: 8.6 MG/DL (ref 8.8–10.2)
CHLORIDE SERPL-SCNC: 105 MEQ/L (ref 98–107)
CO2 SERPL-SCNC: 31 MEQ/L (ref 21–32)
CREAT SERPL-MCNC: 0.76 MG/DL (ref 0.55–1.02)
ERYTHROCYTE [DISTWIDTH] IN BLOOD BY AUTOMATED COUNT: 13.4 % (ref 11.5–14.5)
GFR SERPL CREATININE-BSD FRML MDRD: > 60 ML/MIN/{1.73_M2} (ref 45–?)
GLUCOSE SERPL-MCNC: 121 MG/DL (ref 80–110)
INR PPP: 1.05
MCH RBC QN AUTO: 32.6 PG (ref 27–33)
MCHC RBC AUTO-ENTMCNC: 33.6 G/DL (ref 32–36.5)
MCV RBC AUTO: 97 FL (ref 80–96)
PLATELET # BLD AUTO: 319 K/MM3 (ref 150–450)
POTASSIUM SERPL-SCNC: 3.6 MEQ/L (ref 3.5–5.1)
SODIUM SERPL-SCNC: 141 MEQ/L (ref 136–145)
WBC # BLD AUTO: 13 K/MM3 (ref 4–10)

## 2017-06-27 RX ADMIN — DULOXETINE HYDROCHLORIDE SCH MG: 30 CAPSULE, DELAYED RELEASE ORAL at 10:16

## 2017-06-27 RX ADMIN — CALCIUM SCH MG: 500 TABLET ORAL at 10:16

## 2017-06-27 RX ADMIN — BUDESONIDE AND FORMOTEROL FUMARATE DIHYDRATE SCH PUFF: 160; 4.5 AEROSOL RESPIRATORY (INHALATION) at 09:00

## 2017-06-27 RX ADMIN — OXYBUTYNIN CHLORIDE SCH MG: 5 TABLET, EXTENDED RELEASE ORAL at 20:59

## 2017-06-27 RX ADMIN — DOCUSATE SODIUM,SENNOSIDES SCH TAB: 50; 8.6 TABLET, FILM COATED ORAL at 21:00

## 2017-06-27 RX ADMIN — POTASSIUM CHLORIDE SCH MEQ: 750 TABLET, EXTENDED RELEASE ORAL at 10:15

## 2017-06-27 RX ADMIN — MAGNESIUM HYDROXIDE SCH ML: 400 SUSPENSION ORAL at 09:01

## 2017-06-27 RX ADMIN — DOCUSATE SODIUM,SENNOSIDES SCH TAB: 50; 8.6 TABLET, FILM COATED ORAL at 09:01

## 2017-06-27 RX ADMIN — BUDESONIDE AND FORMOTEROL FUMARATE DIHYDRATE SCH PUFF: 160; 4.5 AEROSOL RESPIRATORY (INHALATION) at 19:46

## 2017-06-27 RX ADMIN — SODIUM CHLORIDE, POTASSIUM CHLORIDE, SODIUM LACTATE AND CALCIUM CHLORIDE SCH MLS/HR: 600; 310; 30; 20 INJECTION, SOLUTION INTRAVENOUS at 00:45

## 2017-06-27 RX ADMIN — CETIRIZINE HYDROCHLORIDE SCH MG: 10 TABLET, FILM COATED ORAL at 10:15

## 2017-06-27 RX ADMIN — DULOXETINE HYDROCHLORIDE SCH MG: 30 CAPSULE, DELAYED RELEASE ORAL at 21:00

## 2017-06-27 RX ADMIN — POLYETHYLENE GLYCOL 3350 SCH PKT: 17 POWDER, FOR SOLUTION ORAL at 09:01

## 2017-06-27 RX ADMIN — OXYBUTYNIN CHLORIDE SCH MG: 5 TABLET, EXTENDED RELEASE ORAL at 10:15

## 2017-06-27 NOTE — RO
DATE OF PROCEDURE:  06/26/2017

 

PREPROCEDURE DIAGNOSES:

1.  Left knee valgus degenerative arthritis.

2.  Left knee retained tibial interference screw.

 

POSTPROCEDURE DIAGNOSES:

1.  Left knee valgus degenerative arthritis.

2.  Left knee retained tibial interference screw.

 

PROCEDURE:

1.  Left total knee arthroplasty using a size 3 cruciate retaining femoral

component and a size 3 tibial tray and a 10 mm rotating platform polyethylene

insert, 35 mm polyethylene button. All components were cemented.  Prosthesis made

by [employer].  It was a PFC knee.

2.  Removal of tibial Acufex interference screw.

 

SURGEON: Dr. LINDA Silva.

 

ASSISTANT: Mr. William Osman.

 

ANESTHESIA: Left femoral nerve block with spinal anesthetic.

 

COMPLICATIONS: None.

 

ESTIMATED BLOOD LOSS:  Less than 20 mL.

 

SPECIMENS:  The joint surface and the proximal tibial Acufex screw.

 

PROCEDURE:  Antibiotics were given intravenously preoperatively and successful

left femoral nerve block and then spinal anesthetic was induced, and a tourniquet

was placed on the left upper thigh and not inflated and the left lower extremity

was then carefully prepped and draped in the usual sterile fashion. Then the leg

was elevated.  Then after appropriate time out, tourniquet was inflated, then a

longitudinal incision was made for medial parapatellar approach to the knee.

Bovie cautery was used to coagulate crossing vessels.  The arthrotomy was then

performed medially and subperiosteal dissection around the proximal medial

portion of the tibial was performed as well as along the proximal lateral tibia

was performed.  The patella was everted and the knee was flexed.  The drill was

placed down the center of the femoral canal and then the intramedullary solitario with

the distal femoral cutting jig set at 5 degree valgus cut at a 10 mm resection

level for a left knee.  It was pinned in position and then the distal femoral

osteotomy was performed.  The AP sizer jig measured right at about a size #3,

thus we chose that size for the femoral component.  The 3 degree external

rotation block was pinned into position followed by the 4-in-1 block then we

performed the anterior  posterior chamfer cuts without difficulty.

 

We then exposed the proximal tibia and used the extramedullary alignment jig to

be sure we were parallel to the mechanical axis.  Then we referenced off the

medial tibial condyle at 4 mm resection level.  Then we pinned the block into

position.  Then the extramedullary solitario was used for a secondary check to be sure

we were parallel to the mechanical access of the tibia. Once we were satisfied

with that, the proximal tibial osteotomy was then performed.

 

The laminar  was then placed laterally and we performed a completion

medial meniscectomy with debridement of the posterior medial osteophytes, then we

placed the laminar  medially and performed a completion lateral

meniscectomy and debridement of the posterior lateral osteophytes.

 

At this point, placed spacer blocks and the size 10 mm spacer block fit the best

with excellent balance between flexion extension gaps and good varus valgus

stress test in both flexion and in extension.

 

We then exposed the proximal tibia once again and at this point, I then dissected

a little bit more distally and medially and found the old Acufex screw head and

had to use a small Osteotome and a drill to open up the screw hole to allow the

introduction of the screwdriver and once I was able to get it seated, the screw

backed out without difficulty.  However, her bone was noteworthy to be quite

hard.  We then exposed the proximal tibia size for #3 tibial tray which was then

pinned into position followed by the reamer and the broach.  Then the trial 10 mm

polyethylene was placed, then we applied the distal femoral trial. It fit nicely,

brought the knee into extension, everted the patella, performed a patellar

osteotomy size for a 35 button.  Lug holes were drilled.  The trial polyethylene

was placed and the patellofemoral tracking was anatomic.  We then felt this was

the appropriate size components to use.  I drilled the lug holes for the femur

and then removed the trial components.  At this point, I started instilling the

Exparel long acting Marcaine into the subperiosteal surfaces around the femur to

include the posterior capsule as well as around the tibia and the parapatellar

area and then the quad tendon edges.  Jad Sauerjolly mixed the cement on the back

table as I prepared the bony surfaces for cementing with a copious amount of

pulsatile lavage irrigant solution and then once surfaces were all thoroughly

dried, I submitted the tibial tray, removed excess cement, then cemented the

femoral component, removed excess cement and then placed the polyethylene,

brought the knee out into extension and then cemented the patellar button, held

the clamp until it was in good position and then copiously pulsatile lavage

irrigated out the knee joint and held the knee in extension until the cement had

hardened.  While we were waiting for the cement to harden, the tranexamic acid

was placed and then we began closing the arthrotomy by placing two #1 PDS sutures

in the apex of the arthrotomy, one at the parapatellar area, then we used a

double armed running #1 Stratafix to close the capsule.  The tourniquet was then

released and we irrigated the subdermal tissues, closed then with interrupted

#2-0 PDS sutures, skin was closed with staples covered by Adaptic dry sterile

bulky dressing.  She was then transferred to the recovery room in stable

condition.  There were no intraoperative complications.

## 2017-06-27 NOTE — CR
DATE OF CONSULT:  06/27/2017

 

CONSULT FOR:  Huy Silva MD

 

REASON FOR CONSULT:  Medical management.

 

SUBJECTIVE:  The patient tells me that she is feeling nauseous after receiving

her morphine, but otherwise denies any other specific complaints.

 

OBJECTIVE:  VITAL SIGNS: Temperature 98.8, pulse 63, respiratory rate 20, blood

pressure (BP) 139/72, oxygen saturation 94% on room air.

GENERAL:  She is obese, elderly  female lying flat in bed.  She does not

appear to be in any acute distress.  She is very pleasant.

HEENT:  Cranial nerves II/XII are grossly intact.  She has moist mucous

membranes.  No elevation of central venous pressure (CVP).

CARDIOVASCULAR EXAM:  S1, S2 regular.  She has not bradycardiac.

RESPIRATORY EXAM:  Clear.

ABDOMINAL EXAM:  Grossly obese.  Bowel sounds present.  The abdomen is soft.

EXTREMITIES:  The left knee is bandage. Dressing is clean, dry and intact.

 

LABORATORY STUDIES:  White blood count (WBC) 13.0, hemoglobin 12.5, hematocrit

37.2, platelet count 319.  Chemistry panel:  Sodium 141, potassium 3.6, chloride

105, bicarbonate 31, BUN 11, creatine 0.7.  INR is 1.0.  She had an x-ray of the

knee, which revealed satisfactory left knee replacement radiographs.

ASSESSMENT AND PLAN:  This 62-year-old female postoperative day one for an

elective left total knee replacement.

 

PROBLEM:

1.  Left total knee replacement.  Pain control and deep vein thrombosis (DVT)

prophylaxis, physical therapy management as per orthopedic surgery.

2.  Glaucoma.  Continue with Xalatan.

3.  Seasonal allergies.  Continue with Singulair and Zyrtec.

4.  Gout.  Continue with Allopurinol.

5.  Asthma. Continue with Symbicort and albuterol as needed.

6.  Chronic pain. Continue with Cymbalta, otherwise as per orthopedic surgery.

7.  Hypokalemia. Continue with chronic potassium chloride 10 mEq daily

8.  Overactive bladder. Continue with Ditropan.

9.  History of pituitary adenoma. Continue with Cabergoline as per her home

dosing.

10. Hypertension.  She is normotensive and receiving significant opiates and just

received anesthesia.  Will hold her hydrochlorothiazide for now and restart as

needed.

 

Thank you for involving us in this interesting patient's care.  Will continue to

follow along with you.  Please call with any specific questions.

## 2017-06-27 NOTE — REP
Clinical:  Status post arthroplasty.

 

Technique:  AP and cross-table lateral views.

 

Findings:  The patient is status post left knee replacement with normal

positioning and appearance to the femoral and tibial components.  Overlying

postsurgical changes appreciated.

 

Impression:

Satisfactory left knee replacement radiographs.

 

 

Signed by

Dejuan Padilla MD 06/27/2017 12:01 P

## 2017-06-28 VITALS — DIASTOLIC BLOOD PRESSURE: 80 MMHG | SYSTOLIC BLOOD PRESSURE: 155 MMHG

## 2017-06-28 LAB
ANION GAP SERPL CALC-SCNC: 5 MEQ/L (ref 8–16)
BUN SERPL-MCNC: 10 MG/DL (ref 7–18)
CALCIUM SERPL-MCNC: 8.5 MG/DL (ref 8.8–10.2)
CHLORIDE SERPL-SCNC: 106 MEQ/L (ref 98–107)
CO2 SERPL-SCNC: 31 MEQ/L (ref 21–32)
CREAT SERPL-MCNC: 0.63 MG/DL (ref 0.55–1.02)
ERYTHROCYTE [DISTWIDTH] IN BLOOD BY AUTOMATED COUNT: 13.3 % (ref 11.5–14.5)
GFR SERPL CREATININE-BSD FRML MDRD: > 60 ML/MIN/{1.73_M2} (ref 45–?)
GLUCOSE SERPL-MCNC: 106 MG/DL (ref 80–110)
INR PPP: 1.29
MCH RBC QN AUTO: 31.9 PG (ref 27–33)
MCHC RBC AUTO-ENTMCNC: 32.9 G/DL (ref 32–36.5)
MCV RBC AUTO: 96.9 FL (ref 80–96)
PLATELET # BLD AUTO: 329 K/MM3 (ref 150–450)
POTASSIUM SERPL-SCNC: 3.9 MEQ/L (ref 3.5–5.1)
SODIUM SERPL-SCNC: 142 MEQ/L (ref 136–145)
WBC # BLD AUTO: 9.4 K/MM3 (ref 4–10)

## 2017-06-28 RX ADMIN — OXYBUTYNIN CHLORIDE SCH MG: 5 TABLET, EXTENDED RELEASE ORAL at 08:12

## 2017-06-28 RX ADMIN — POLYETHYLENE GLYCOL 3350 SCH PKT: 17 POWDER, FOR SOLUTION ORAL at 08:11

## 2017-06-28 RX ADMIN — DULOXETINE HYDROCHLORIDE SCH MG: 30 CAPSULE, DELAYED RELEASE ORAL at 08:12

## 2017-06-28 RX ADMIN — CALCIUM SCH MG: 500 TABLET ORAL at 08:11

## 2017-06-28 RX ADMIN — MAGNESIUM HYDROXIDE SCH ML: 400 SUSPENSION ORAL at 08:11

## 2017-06-28 RX ADMIN — DOCUSATE SODIUM,SENNOSIDES SCH TAB: 50; 8.6 TABLET, FILM COATED ORAL at 08:12

## 2017-06-28 RX ADMIN — CETIRIZINE HYDROCHLORIDE SCH MG: 10 TABLET, FILM COATED ORAL at 08:11

## 2017-06-28 RX ADMIN — POTASSIUM CHLORIDE SCH MEQ: 750 TABLET, EXTENDED RELEASE ORAL at 08:12

## 2017-06-28 RX ADMIN — BUDESONIDE AND FORMOTEROL FUMARATE DIHYDRATE SCH PUFF: 160; 4.5 AEROSOL RESPIRATORY (INHALATION) at 07:14

## 2017-06-28 NOTE — ECGEPIP
Stationary ECG Study

                              [facility]

                                       

                                       Test Date:    2017

Pat Name:     JERRY MILLER           Department:   

Patient ID:   [id number]                 Room:         [place] [address]

Gender:       F                        Technician:   

:          [id number]               Requested By: LILY VASQUEZ 

Order Number: [id number]     Reading MD:   Darian Tidwell

                                 Measurements

Intervals                              Axis          

Rate:         45                       P:            23

NM:           153                      QRS:          25

QRSD:         104                      T:            34

QT:           484                                    

QTc:          422                                    

                           Interpretive Statements

SINUS BRADYCARDIA

POSSIBLE INFERIOR MYOCARDIAL INFARCTION, PROBABLY OLD

SIMILAR TO 17

 

Electronically Signed On 2017 7:35:14 EDT by Darian Tidwell

## 2017-06-29 ENCOUNTER — HOSPITAL ENCOUNTER (OUTPATIENT)
Dept: HOSPITAL 53 - M LAB REF | Age: 62
End: 2017-06-29
Attending: NURSE PRACTITIONER
Payer: COMMERCIAL

## 2017-06-29 DIAGNOSIS — Z79.01: ICD-10-CM

## 2017-06-29 DIAGNOSIS — Z51.81: Primary | ICD-10-CM

## 2017-06-29 LAB — INR PPP: 1.67

## 2017-06-30 NOTE — DSES
DATE OF ADMISSION:  06/26/2017

DATE OF DISCHARGE:  06/28/2017

 

ADMITTING PROVIDER:  Dr. Silva

 

ADMITTING DIAGNOSES:

1.  Left knee valgus degenerative arthritis.

2.  Left knee retained tibial interference screw from previous anterior cruciate

ligament reconstruction.

 

OTHER DIAGNOSES:

1.  Asthma.

2.  Hypertension.

3.  Urinary incontinence.

4.  Pituitary adenoma.

5.  Gout.

6.  Glaucoma.

7.  Seasonal allergies.

8.  Chronic back pain.

 

DISCHARGE DIAGNOSIS:  Status post left total knee arthroplasty and removal of

interference screw.

 

HISTORY OF PRESENT ILLNESS:  The patient is a 62-year-old female with continuing

left knee pain and stiffness.  She failed to improve with conservative measures

so she elected for a left total knee arthroplasty and removal of interference

screw from previous ACL surgery.

 

OPERATION PERFORMED:  Left total knee arthroplasty with removal of [employer]ix

interference screw.

 

HOSPITAL COURSE:  The patient underwent a left total knee arthroplasty and

removal of interference screw under spinal anesthesia which was uneventful.  She

was up with physical therapy per their protocol, weight bearing as tolerated on

the left lower extremity.  The hospital team did feel comfortable discharging the

patient.  She will be discharged on oral pain medications and will resume her

preoperative medications and diet.  She will use her thromboembolic deterrent

stockings and take her Coumadin for 30 days postoperative to prevent deep venous

thrombosis.  She will followup in our office in 12-14 days or sooner for staple

removal.  She is encouraged to contact our office sooner if she has any increased

pain, drainage, bleeding, redness, numbness and tingling down into her left lower

extremity, fever greater than 101 degrees, or any other concerns.  Please see

medical record for additional details.

## 2017-07-06 ENCOUNTER — HOSPITAL ENCOUNTER (OUTPATIENT)
Dept: HOSPITAL 53 - M SHH | Age: 62
End: 2017-07-06
Attending: NURSE PRACTITIONER
Payer: COMMERCIAL

## 2017-07-06 DIAGNOSIS — Z79.01: ICD-10-CM

## 2017-07-06 DIAGNOSIS — Z51.81: Primary | ICD-10-CM

## 2017-07-06 LAB — INR PPP: 2.83

## 2017-07-13 ENCOUNTER — HOSPITAL ENCOUNTER (OUTPATIENT)
Dept: HOSPITAL 53 - M SHH | Age: 62
End: 2017-07-13
Attending: NURSE PRACTITIONER
Payer: COMMERCIAL

## 2017-07-13 DIAGNOSIS — Z79.01: Primary | ICD-10-CM

## 2017-07-13 LAB — INR PPP: 1.75

## 2017-09-15 ENCOUNTER — HOSPITAL ENCOUNTER (OUTPATIENT)
Dept: HOSPITAL 53 - M PAIN | Age: 62
End: 2017-09-15
Attending: ANESTHESIOLOGY
Payer: COMMERCIAL

## 2017-09-15 DIAGNOSIS — Z79.899: ICD-10-CM

## 2017-09-15 DIAGNOSIS — M47.817: ICD-10-CM

## 2017-09-15 DIAGNOSIS — E11.9: ICD-10-CM

## 2017-09-15 DIAGNOSIS — Z88.2: ICD-10-CM

## 2017-09-15 DIAGNOSIS — M17.10: ICD-10-CM

## 2017-09-15 DIAGNOSIS — M47.816: ICD-10-CM

## 2017-09-15 DIAGNOSIS — Z91.018: ICD-10-CM

## 2017-09-15 DIAGNOSIS — Z88.1: ICD-10-CM

## 2017-09-15 DIAGNOSIS — I10: ICD-10-CM

## 2017-09-15 DIAGNOSIS — J45.909: ICD-10-CM

## 2017-09-15 DIAGNOSIS — M54.5: ICD-10-CM

## 2017-09-15 DIAGNOSIS — G89.29: Primary | ICD-10-CM

## 2017-09-15 DIAGNOSIS — Z88.8: ICD-10-CM

## 2017-09-15 DIAGNOSIS — Z91.040: ICD-10-CM

## 2017-09-15 DIAGNOSIS — Z88.0: ICD-10-CM

## 2017-09-15 DIAGNOSIS — L23.0: ICD-10-CM

## 2017-09-20 ENCOUNTER — HOSPITAL ENCOUNTER (OUTPATIENT)
Dept: HOSPITAL 53 - M LAB REF | Age: 62
End: 2017-09-20
Attending: NURSE PRACTITIONER
Payer: COMMERCIAL

## 2017-09-20 DIAGNOSIS — B35.3: Primary | ICD-10-CM

## 2017-09-22 NOTE — ECWPNPC
PATIENT NAME: JERRY MILLER

: 1955

GENDER: FEMALE

MRN: [id number]

VISIT DATE: 09/15/2017

DISCHARGE DATE: 09/15/17 1459

VISIT LOCKED DATE TIME: 

PHYSICIAN: PEDRO HERNÁNDEZ  

RESOURCE: PEDRO HERNÁNDEZ  

 

           

           

REASON FOR APPOINTMENT

           

          1. LOW BACK PAIN W.C

           

HISTORY OF PRESENT ILLNESS

           

      HISTORY OF PRESENT ILLNESS:

      PAIN

           

           

          THE PATIENT DESCRIBES THE PAIN...

           

          61 YEAR OLD FEMALE PATIENT WITH HISTORY OF CHRONIC LOW BACK PAIN.

          PATIENT DESCRIBES THE PAIN AS ACHING, STABBING, TENDER,

          THROBBING, SORE, SHOOTING, AND HAVING IT ALL THE TIME ON THE LEFT

          LOWER BACK WITH A PAIN SCORE OF 7/10. PATIENT WAS INJURED IN A

          WORK RELATED INJURY ON 2005 WORKING FOR [employer]

          [facility] AND [facility], PATIENT WAS HELPING A CLIENT ONTO THE TOILET

          WHEN SHE INJURED HER BACK. MRS. MILLER RECEIVED A DIAGNOSTIC

          FACET BLOCK ON 17 AND REPORTS HAVING OVER 50% RELIEF IN PAIN

          AND INCREASED MOBILITY AND FUNCTIONALITY FOR OVER 12 HOURS AND

          SHE WOULD LIKE TO MOVE FORWARD WITH THE RADIOFREQUENCY. .

          CURRENTLY THE PATIENT IS USING GABAPENTIN, SKELAXIN, CYMBALTA,

          AND TRAMADOL AND STATES THAT THE MEDICATION KEEPS HER MOBILE AND

          FUNCTIONAL. PATIENT DENIES UNEXPLAINABLE WEIGHT LOSS, FEVER,

          CHILLS, NEW CHANGES ON HER URINARY OR BOWEL CONTROL.

           

      FALL RISK SCREENING:

      SCREENING

           

           

          :NO FALLS IN THE PAST YEAR

           

CURRENT MEDICATIONS

           

          TAKING ALLOPURINOL 300 MG TABLET 1 TABLET ORALLY ONCE A DAY

          TAKING SINGULAIR 10 MG TABLET 1 TABLET IN THE EVENING ORALLY ONCE

          A DAY

          TAKING BENTYL 20 MG TABLET 1 TABLET ORALLY EVERY 4 HOURS AS

          NEEDED

          TAKING VENTOLIN  (90 BASE) MCG/ACT AEROSOL SOLUTION 2

          PUFFS INHALATION AS NEEDED

          TAKING VITAMIN B COMPLEX CAPSULE AS DIRECTED ORALLY DAILY

          TAKING HYDROCHLOROTHIAZIDE 25 25 MG TABLET AS DIRECTED ORAL DAILY

          TAKING LATANOPROST 0.005 % SOLUTION 1 DROP INTO BOTH EYES EVENING

          OPHTHALMIC ONCE A DAY

          TAKING SYMBICORT 80-4.5 MCG/ACT AEROSOL 2 PUFFS INHALATION TWICE

          A DAY

          TAKING PREMARIN 0.625 MG/GM CREAM VAGINAL THREE TIMES A WEEK

          TAKING TYLENOL 500 MG TABLET 1 TABLET AS NEEDED ORALLY EVERY 6

          HRS

          TAKING BENTYL 10 MG CAPSULE 1 CAPSULE ORALLY TWO TIMES A DAY

          TAKING REFRESH CELLUVISC 1 % SOLUTION 1 DROP OU OPHTHALMIC 24

          TIME(S) A DAY

          TAKING DIFLUCAN 200 MG TABLET 1 TABLET ORALLY AS DIRECTED

          TAKING FIBER 58.6 % POWDER 2 CAPSULES WITH 8 OUNCES OF LIQUID

          ORALLY DAILY

          TAKING CRANBERRY 405 MG CAPSULE ORALLY DAILY

          TAKING FLONASE ALLERGY RELIEF 50 MCG/ACT SUSPENSION 1 SPRAY IN

          EACH NOSTRIL NASALLY ONCE A DAY

          TAKING OXYBUTYNIN CHLORIDE 5 MG TABLET 2 TABLETS ORALLY 2 TIMES A

          DAY

          TAKING ALPHA LIPOIC ACID 100 MG CAPSULE 1 CAP ORALLY DAILY

          TAKING POTASSIUM CHLORIDE 10 MEQ CAPSULE EXTENDED RELEASE 1

          CAPSULE WITH FOOD ORALLY ONCE A DAY

          TAKING PROBIOTIC - CAPSULE ORALLY DAILY

          TAKING OMEPRAZOLE 20 MG CAPSULE DELAYED RELEASE 1 CAP ORALLY ONCE

          A DAY

          TAKING ALLERX 1 DROP OU BID

          TAKING TRAMADOL HCL 50 MG TABLET 1 TABLET AS NEEDED ORALLY Q8H

          PRN MDD3

          TAKING CYMBALTA 30 MG CAPSULE DELAYED RELEASE PARTICLES 1 CAPSULE

          ORALLY FOR PAIN TWICE A DAY MDD2

          TAKING GABAPENTIN 300 MG CAPSULE 1 CAPSULE ORALLY SPECIAL FUNDS

          TWO TIMES A DAY FOR PAINMDD2

          TAKING SKELAXIN 800 MG TABLET 1 TABLET ORALLY BID PRN FOR SPASMS

          AND PAIN MDD 2

          NOT-TAKING ACETAMINOPHEN 500 MG TABLET 2 TABLETS AS NEEDED ORALLY

          EVERY 6 HRS FOR PAIN MDD4

          NOT-TAKING XIIDRA 5 % SOLUTION 1 DROP INTO AFFECTED EYE

          OPHTHALMIC TWICE A DAY

          NOT-TAKING CABERGOLINE 0.5 MG TABLET 0.5 TABLET ORALLY TWICE A

          WEEK (TAKES 0.05 MG 1/2 TABLET), NOTES: MEDICATION DISCONTINUED

          NOT-TAKING MONOVISC 88 MG/4ML SOLUTION PREFILLED SYRINGE

          INTRA-ARTICULAR EVERY 6 MONTHS, NOTES: EVERY 6 MO (LAST 16)

          NOT-TAKING RHINOCORT AQUA 32 MCG/ACT SUSPENSION 1 SPRAY IN EACH

          NOSTRIL NASALLY AS NEEDED

          NOT-TAKING OXYBUTYNIN CHLORIDE ER 10 MG TABLET EXTENDED RELEASE

          24 HOUR 1 TABLET ORALLY BID

          NOT-TAKING ULTRAM 50 MG TABLET 1 TABLET AS NEEDED FOR PAIN ORALLY

          EVERY 6 HRS MDD1

          NOT-TAKING NASACORT ALLERGY 24HR 55 MCG/ACT AEROSOL 1 PUFF IN

          EACH NOSTRIL NASALLY ONCE A DAY

          NOT-TAKING CELEBREX 100 MG CAPSULE 1 CAPSULE ORALLY TWICE A DAY

          NOT-TAKING VITAMIN C 500 MG TABLET AS DIRECTED ORALLY DAILY,

          NOTES: 16 0900

          MEDICATION LIST REVIEWED AND RECONCILED WITH THE PATIENT

           

PAST MEDICAL HISTORY

           

          DIABETES

          BLOOD PRESSURE

          ASTHMA

          PITUITARY ADENOMA

          ARTHRITIS

          RUPTURED DISC/BACK PAIN

          IBS WITH DIARRHEA

          OPTIC DAMAGE DUE TO HYPERTENSION

          CHRONIC SINUSITIS

           

ALLERGIES

           

          PENICILLIN (FOR ALLERGIES USE ONLY): RASH: ALLERGY

          SULFA (FOR ALLERGY USE ONLY): RASH: ALLERGY

          PHENERGAN: BLOOD CLOT: SIDE EFFECTS

          LATEX CONDOMS, RUBBER BANDS, GLOVES: RASH: ALLERGY

          ERYTHROMYCIN: RASH: ALLERGY

          NICLKEL: RASH: ALLERGY

          SULFITES: DYSPNEA: ALLERGY

          WHEAT: GI UPSET, WATERY EYES AND SINUS CONGERSTION: SIDE EFFECTS

           

REVIEW OF SYSTEMS

           

      REVIEWED BY:

           

          PROVIDER: PEDRO HERNÁNDEZ MD .

           

      CONSTITUTIONAL:

           

          ANY CHANGE IN YOUR MEDICAL CONDITION? YES, TOTAL KNEE REPLACEMENT

          17 . CHILLS NO . FEVER NO .

           

      INFECTION:

           

          DO YOU HAVE NEW INFECTIONS? NO . DO YOU HAVE HISTORY OF MRSA? NO

          .

           

      MUSCULOSKELETAL:

           

          ANY NEW PATTERNS OF PAIN OR NUMBNESS? NO .

           

      GASTROENTEROLOGY:

           

          ANY NEW CHANGE IN BOWEL CONTROL? NO .

           

      GENITOURINARY:

           

          ANY NEW CHANGE IN BLADDER CONTROL? NO . IS THERE A CHANCE YOU

          COULD BE PREGNANT? NO .

           

      HEMATOLOGY/LYMPH:

           

          DO YOU TAKE ANY BLOOD THINNERS? (FOR EXAMPLE- COUMADIN, PLAVIX,

          AGGRENOX, PLATEL, PRADAXA, OR XARELTO) NO . WHEN WAS YOUR LAST

          DOSE? DATE: TIME: .

           

      NEUROLOGY:

           

          HAVE YOU FALLEN IN THE PAST 6 MONTHS? NO . ANY NEW EXTREMITY

          NUMBNESS OR WEAKNESS? NO .

           

      CARDIOLOGY:

           

          DO YOU HAVE A PACEMAKER OR DEFIBRILLATOR? NO .

           

      RESPIRATORY:

           

          HAVE YOU BEEN SICK IN THE PAST WEEK? NO . FEVER NO . FLU LIKE

          SYMPTOMS? NO . COUGH NO .

           

      INTEGUMENTARY:

           

          DO YOU HAVE ANY RASHES OR OPEN SORES? NO .

           

      ALLERGIC/IMMUNO:

           

          ARE YOU ALLERGIC TO SHELLFISH OR IV DYE? NO . ANY NEW ALLERGIES?

          NO .

           

      PSYCHIATRIC:

           

          DO YOU HAVE THOUGHTS OF HURTING YOURSELF OR SOMEONE ELSE? NO .

          ARE YOU ABUSED, NEGLECTED, OR IN AN UNSAFE ENVIRONMENT? NO .

           

      ENDOCRINOLOGY:

           

          ARE YOU DIABETIC? NO .

           

      OTHER:

           

          DO YOU NEED ANY PRESCRIPTIONS? YES, CYMBALTA, NEURONTIN, SKELAXIN

          . IF YES, PLEASE LIST: ____ . ANY NEW PROBLEMS WITH YOUR

          MEDICATIONS? NO . WHEN DID YOU LAST EAT? ____ . WHEN DID YOU LAST

          DRINK? ____ . WHAT DID YOU LAST DRINK? ____ . NAME OF PERSON

          DRIVING YOU HOME? ____ . DO YOU HAVE ANY OTHER QUESTIONS OR

          CONCERNS YES, SCHEDULE RIGHT RADIOFREQUENCY .

           

VITAL SIGNS

           

          .2 LBS, HT 65", BMI 39.14 INDEX, /75 MM HG, HR 90

          /MIN, RR 18 /MIN, TEMP 98.5 F, OXYGEN SAT % 99%, NA INITIALS SC

          14:10.

           

EXAMINATION

           

       : PATIENT IS ALERT O X 3 AND COOPERATIVE. ANTALGIC

          GAIT. TENDERNESS IN THE LOWER BACK AND PARASPINAL MUSCLE GROUP

          MOSTLY THE RIGHT SIDE. PATIENT IS ABLE TO EXTEND HER BACK AT 15

          DEGREES WITH PAIN AND DISCOMFORT, PATIENT IS FLEXIBLE IS FLEXING

          HER BACK. MRI DONE ON 16 OF THE LUMBAR SPINE SHOWS DISC

          BULGES AT L2-L3 AND L3-L4 ALONG WITH STENOSIS AT L3-L4.

           

ASSESSMENTS

           

          SPONDYLOSIS OF LUMBAR REGION WITHOUT MYELOPATHY OR RADICULOPATHY

          - M47.816 (PRIMARY)

           

          LOW BACK PAIN OF OVER 3 MONTHS DURATION - M54.5

           

          SPONDYLOSIS OF LUMBOSACRAL REGION WITHOUT MYELOPATHY OR

          RADICULOPATHY - M47.817

           

TREATMENT

           

      LOW BACK PAIN OF OVER 3 MONTHS DURATION

          REFILL TRAMADOL HCL TABLET, 50 MG, 1 TABLET AS NEEDED, ORALLY,

          Q8H PRN MDD3, 30 DAY(S), 45, REFILLS 2

          NOTES: WE DISCUSSED SEVERAL ISSUES WITH MRS. MILLER'S PAIN

          MANAGEMENT CASE. AT THIS TIME THE PATIENT WILL CONTINUE WITH THE

          SAME MEDICATION REGIME AS BEFORE. PATIENT WILL USE THE CYMBALTA

          FOR THE NEUROPATHIC PAIN, GABAPENTIN FOR THE NEUROPATHIC PAIN,

          TRAMADOL FOR THE SOMATIC PAIN, AND SKELAXIN FOR THE MUSCLE

          SPASMS. PATIENT DENIES ABUSE OF ANY MEDICATION, DENIES USE OF

          ILLEGAL SUBSTANCES, AND STATES THAT SHE IS ONLY USING THE

          MEDICATION FOR PAIN MANAGEMENT. URINE TOXICOLOGY REPORT DONE ON

          17 SHOWS CONSISTENT RESULTS WITH THE PATIENT'S MEDICATION

          LIST. PATIENT HAS HAD TWO DIAGNOSTIC TEST FOR A RADIOFREQUENCY

          AND BOTH TESTS HAVE HAD ADEQUATE RESULTS TO MOVE FORWARD WITH A

          RADIOFREQUENCY. WE DISCUSSED THE RISKS, BENENFITS, AND

          ALTNERATIVES OF THE INJECTION AND THE PATIENT WOULD LIKE TO

          PROCEED AT THIS TIME. I WOULD ALSO LIKE THE PATIENT TO BEGIN

          PHYSICAL THERAPY AS IT HAS AIDED THE PATIENT IN PAIN RELIEF IN

          THE PAST AS WELL AS INCREASING MOBILITY AND FUNCTIONALITY.

          PATIENT REPORTS IT WAS EASIER TO BEND OVER TO TIE HER SHOES AND

          TO DO HOUSEHOLD ACTIVITIES. INSTRUCTIONS WERE GIVEN, QUESTIONS

          WERE ANSWERED, PATIENT REPORTS UNDERSTANDING AND AGREES WITH THE

          PLAN. I, ANMOL DE LOS SANTOS, DOCUMENTED THE ABOVE INFORMATION ACTING

          AS A SCRIBE FOR DR. HERNÁNDEZ. I HAVE REVIEWED THE ABOVE DOCUMENT,

          WRITTEN BY ANMOL VILLEGAS AND I VERIFY THAT IT IS

          ACCURATE.

           

           

      OTHERS

          REFILL CYMBALTA CAPSULE DELAYED RELEASE PARTICLES, 30 MG, 1

          CAPSULE, ORALLY FOR PAIN, TWICE A DAY MDD2, 30 DAY(S), 60,

          REFILLS 2

          REFILL GABAPENTIN CAPSULE, 300 MG, 1 CAPSULE, ORALLY SPECIAL

          FUNDS, TWO TIMES A DAY FOR PAINMDD2, 30 DAY(S), 60, REFILLS 2

          REFILL SKELAXIN TABLET, 800 MG, 1 TABLET, ORALLY, BID PRN FOR

          SPASMS AND PAIN MDD 2, 30 DAY(S), 55, REFILLS 2

           

PROCEDURES

           

      PN WORKMANS' COMP OPINION

          IN YOUR OPINION, WAS THE INCIDENT THAT THE PATIENT DESCRIBED THE

          COMPETENT MEDICAL CAUSE OF THIS INJURY/ILLNESS? YES

          ARE THE PATIENT'S COMPLAINTS CONSISTENT WITH HIS/HER HISTORY OF

          THE INJURY/ILLNESS? YES

          IS THE PATIENT'S HISTORY OF THE INJURY/ILLNESS CONSISTENT WITH

          YOUR OBJECTIVE FINDING? YES

          WHAT IS THE PERCENTAGE OF TEMPORARY IMPAIRMENT? MODERATE TO

          MARKED = 66.7%

          IS THE PATIENT WORKING? NO

          DOCTOR ON SITE: PEDRO MILLER MD

           

PREVENTIVE MEDICINE

           

          DISCUSSED PRE PROCEDURE CARE AND RADIOFREQUENCY WITH PT

          EXPRESSING UNDERSTANDING.

           

PROCEDURE CODES

           

           ESTABILISHED PATIENT [facility] FACILITY CHARGE

           

           DOC MEDS VERIFIED W/PT OR RE

           

           PAIN ASSESS POS TOOL F/U PLAN DOC

           

DISPOSITION & COMMUNICATION

           

FOLLOW UP

           

          RF AFTER APPROVAL

           

 

ELECTRONICALLY SIGNED BY PEDRO HERNÁNDEZ MD ON

          2017 AT 01:32 PM EDT

           

           

           

 

DISCLAIMER :

THIS IS A VISIT SUMMARY EXTRACTED FROM THE [employer] CHART.

IT IS NOT A COPY OF THE [employer] PROGRESS NOTE.

AGUSTINA

## 2017-09-28 ENCOUNTER — HOSPITAL ENCOUNTER (OUTPATIENT)
Dept: HOSPITAL 53 - M PAIN | Age: 62
End: 2017-09-28
Attending: ANESTHESIOLOGY
Payer: COMMERCIAL

## 2017-09-28 DIAGNOSIS — Z79.899: ICD-10-CM

## 2017-09-28 DIAGNOSIS — Z88.2: ICD-10-CM

## 2017-09-28 DIAGNOSIS — M47.817: ICD-10-CM

## 2017-09-28 DIAGNOSIS — Z91.02: ICD-10-CM

## 2017-09-28 DIAGNOSIS — L23.0: ICD-10-CM

## 2017-09-28 DIAGNOSIS — Z91.040: ICD-10-CM

## 2017-09-28 DIAGNOSIS — Z79.891: ICD-10-CM

## 2017-09-28 DIAGNOSIS — G89.29: Primary | ICD-10-CM

## 2017-09-28 DIAGNOSIS — Z88.0: ICD-10-CM

## 2017-09-28 DIAGNOSIS — Z88.1: ICD-10-CM

## 2017-09-28 DIAGNOSIS — E11.9: ICD-10-CM

## 2017-09-28 DIAGNOSIS — Z79.51: ICD-10-CM

## 2017-09-28 DIAGNOSIS — J45.909: ICD-10-CM

## 2017-09-28 DIAGNOSIS — M47.816: ICD-10-CM

## 2017-09-28 DIAGNOSIS — I10: ICD-10-CM

## 2017-09-28 PROCEDURE — 64635 DESTROY LUMB/SAC FACET JNT: CPT

## 2017-09-28 PROCEDURE — 64636 DESTROY L/S FACET JNT ADDL: CPT

## 2017-09-29 NOTE — REP
FACET BLOCK:

 

The images were reviewed with Dr. Kruse  prior to dictation.

 

The portable C-arm is provided in the OR for Dr. Gracia for fluoroscopic

guidance. Five intraoperative fluoroscopic spot films were obtained using last

image hold technology for needle placement verification for this right lumbar

facet injection. The films are on the PACS system and are available for review.

 

47 seconds of fluoroscopy time were utilized for this procedure.

 

 

Reviewed by

JERROD Ash 09/29/2017 01:24 PEdited and Signed by

Shon Kruse MD 09/29/2017 05:12 P

## 2017-10-10 NOTE — ECWPNPC
PATIENT NAME: JERRY MILLER

: 1955

GENDER: FEMALE

MRN: [id number]

VISIT DATE: 2017

DISCHARGE DATE: 17

VISIT LOCKED DATE TIME: 

PHYSICIAN: PEDRO HERNÁNDEZ  

RESOURCE: PEDRO HERNÁNDEZ  

 

           

           

REASON FOR APPOINTMENT

           

          1. RIGHT RF W/C

           

HISTORY OF PRESENT ILLNESS

           

      HISTORY OF PRESENT ILLNESS:

      PAIN

           

           

          THE PATIENT DESCRIBES THE PAIN...

           

      FALL RISK SCREENING:

      SCREENING

           

           

          :NO FALLS IN THE PAST YEAR

           

CURRENT MEDICATIONS

           

          TAKING ALLOPURINOL 300 MG TABLET 1 TABLET ORALLY ONCE A DAY,

          NOTES: 17

          TAKING SINGULAIR 10 MG TABLET 1 TABLET IN THE EVENING ORALLY ONCE

          A DAY, NOTES: 17

          TAKING BENTYL 20 MG TABLET 1 TABLET ORALLY EVERY 4 HOURS AS

          NEEDED, NOTES: > 3 DAYS

          TAKING VENTOLIN  (90 BASE) MCG/ACT AEROSOL SOLUTION 2

          PUFFS INHALATION AS NEEDED, NOTES: > 1 YEAR

          TAKING VITAMIN B COMPLEX CAPSULE AS DIRECTED ORALLY DAILY, NOTES:

          17

          TAKING HYDROCHLOROTHIAZIDE 25 25 MG TABLET AS DIRECTED ORAL

          DAILY, NOTES: 17

          TAKING LATANOPROST 0.005 % SOLUTION 1 DROP INTO BOTH EYES EVENING

          OPHTHALMIC ONCE A DAY, NOTES: 17

          TAKING SYMBICORT 80-4.5 MCG/ACT AEROSOL 2 PUFFS INHALATION TWICE

          A DAY, NOTES: 17

          TAKING PREMARIN 0.625 MG/GM CREAM VAGINAL THREE TIMES A WEEK,

          NOTES: > 3 WEEKS

          TAKING TYLENOL 500 MG TABLET 1 TABLET AS NEEDED ORALLY EVERY 6

          HRS, NOTES: 17

          TAKING BENTYL 10 MG CAPSULE 1 CAPSULE ORALLY TWO TIMES A DAY,

          NOTES: > 3 DAYS

          TAKING REFRESH CELLUVISC 1 % SOLUTION 1 DROP OU OPHTHALMIC 24

          TIME(S) A DAY, NOTES: 17

          TAKING DIFLUCAN 200 MG TABLET 1 TABLET ORALLY AS DIRECTED, NOTES:

          5 DAYS AGO

          TAKING FIBER 58.6 % POWDER 2 CAPSULES WITH 8 OUNCES OF LIQUID

          ORALLY DAILY, NOTES: 17

          TAKING CRANBERRY 405 MG CAPSULE ORALLY DAILY, NOTES: 17

          TAKING FLONASE ALLERGY RELIEF 50 MCG/ACT SUSPENSION 1 SPRAY IN

          EACH NOSTRIL NASALLY ONCE A DAY AS NEEDED, NOTES: NONE RECENTLY

          TAKING OXYBUTYNIN CHLORIDE 5 MG TABLET 1 TABLET ORALLY 2 TIMES A

          DAY, NOTES: 17 08

          TAKING ALPHA LIPOIC ACID 100 MG CAPSULE 1 CAP ORALLY DAILY,

          NOTES: 17 08

          TAKING POTASSIUM CHLORIDE 10 MEQ CAPSULE EXTENDED RELEASE 1

          CAPSULE WITH FOOD ORALLY ONCE A DAY, NOTES: 17 08

          TAKING PROBIOTIC - CAPSULE ORALLY DAILY, NOTES: 17 08

          TAKING OMEPRAZOLE 20 MG CAPSULE DELAYED RELEASE 1 CAP ORALLY ONCE

          A DAY AS NEEDED, NOTES: NONE RECCENTLY

          TAKING ALLERX 1 DROP BOTH EYES DAILY, NOTES: 17 2100

          TAKING TRAMADOL HCL 50 MG TABLET 1 TABLET AS NEEDED ORALLY Q8H

          PRN MDD3, NOTES: 17 1200

          TAKING CYMBALTA 30 MG CAPSULE DELAYED RELEASE PARTICLES 1 CAPSULE

          ORALLY FOR PAIN TWICE A DAY MDD2, NOTES: 17 08

          TAKING GABAPENTIN 300 MG CAPSULE 1 CAPSULE ORALLY SPECIAL FUNDS

          TWO TIMES A DAY FOR PAINMDD2, NOTES: 17 08

          TAKING SKELAXIN 800 MG TABLET 1 TABLET ORALLY BID PRN FOR SPASMS

          AND PAIN MDD 2, NOTES: 17

          TAKING CABERGOLINE 0.5 MG TABLET 0.5 TABLET ORALLY TWICE A WEEK

          (TAKES 0.05 MG 1/2 TABLET), NOTES: 17

          NOT-TAKING ACETAMINOPHEN 500 MG TABLET 2 TABLETS AS NEEDED ORALLY

          EVERY 6 HRS FOR PAIN MDD4

          NOT-TAKING XIIDRA 5 % SOLUTION 1 DROP INTO AFFECTED EYE

          OPHTHALMIC TWICE A DAY

          NOT-TAKING MONOVISC 88 MG/4ML SOLUTION PREFILLED SYRINGE

          INTRA-ARTICULAR EVERY 6 MONTHS, NOTES: EVERY 6 MO (LAST 16)

          NOT-TAKING RHINOCORT AQUA 32 MCG/ACT SUSPENSION 1 SPRAY IN EACH

          NOSTRIL NASALLY AS NEEDED

          NOT-TAKING OXYBUTYNIN CHLORIDE ER 10 MG TABLET EXTENDED RELEASE

          24 HOUR 1 TABLET ORALLY BID

          NOT-TAKING ULTRAM 50 MG TABLET 1 TABLET AS NEEDED FOR PAIN ORALLY

          EVERY 6 HRS MDD1

          NOT-TAKING NASACORT ALLERGY 24HR 55 MCG/ACT AEROSOL 1 PUFF IN

          EACH NOSTRIL NASALLY ONCE A DAY

          NOT-TAKING CELEBREX 100 MG CAPSULE 1 CAPSULE ORALLY TWICE A DAY

          NOT-TAKING VITAMIN C 500 MG TABLET AS DIRECTED ORALLY DAILY,

          NOTES: 16 0900

          MEDICATION LIST REVIEWED AND RECONCILED WITH THE PATIENT

           

PAST MEDICAL HISTORY

           

          DIABETES

          BLOOD PRESSURE

          ASTHMA

          PITUITARY ADENOMA

          ARTHRITIS

          RUPTURED DISC/BACK PAIN

          IBS WITH DIARRHEA

          OPTIC DAMAGE DUE TO HYPERTENSION

          CHRONIC SINUSITIS

           

ALLERGIES

           

          PENICILLIN (FOR ALLERGIES USE ONLY): RASH: ALLERGY

          SULFA (FOR ALLERGY USE ONLY): RASH: ALLERGY

          PHENERGAN: BLOOD CLOT: SIDE EFFECTS

          LATEX CONDOMS, RUBBER BANDS, GLOVES: RASH: ALLERGY

          ERYTHROMYCIN: RASH: ALLERGY

          NICLKEL: RASH: ALLERGY

          SULFITES: DYSPNEA: ALLERGY

          WHEAT: GI UPSET, WATERY EYES AND SINUS CONGERSTION: SIDE EFFECTS

           

SURGICAL HISTORY

           

          BTL 1987

          GB 1991

          ACL 2001

          RIGHT ROTATOR CUFF 2015

          LEFT TOTAL KNEE REPLACEMENT 2017

           

SOCIAL HISTORY

           

          GENERAL:

           

          TOBACCO USE

          ARE YOU A:NONSMOKER

           

           

          ALCOHOL SCREENING

          POINTS0

          INTERPRETATIONNEGATIVE

           

           

          Congregational

          [id number] Sabianism

           

           

          LEARNING BARRIERS / SPECIAL NEEDS

          CHANGE FROM LAST VISIT?NO

          BARRIERS TO LEARNING?NO

          HEARING IMPAIRED?NO

          VISION IMPAIRED?YES ENLARGED OPTIC NERVE RELATED TO GESTATIONAL

          HYPERTENSION.

          :CORRECTIVE LENSES

          COGNITIVELY IMPAIRED?NO

          READINESS TO LEARN?YES

          LEARNING PREFERENCES?NO

          LEARNING CAPABILITIES PRESENT?YES

          EMOTIONAL BARRIERS?NO

          SPECIAL DEVICES?NO

           NEEDED?NO

           

           

          NEW PATIENT PAIN DIARY

          TODAY'S VISITNOTES

          FROM 0-10, WHAT LEVEL IS YOUR PAIN TODAY?0

           

           

          PAIN CLINIC PFS, CLERGY, PUBLIC HEALTH REFERRALS

          PFS REFERRAL NEEDED?NO

          CLERGY REFERRAL NEEDED?NO

          PUBLIC HEALTH REFERRAL NEEDED?NO

          WAS THE PROVIDER NOTIFIED OF ANY PERTINENT INFO?NO

          HAS THE PATIENT BEEN EDUCATED REGARDING HIS/HER PLAN OF CARE?YES

          HAS THE PATIENT BEEN EDUCATED REGARDING PAIN, THE RISK FOR PAIN,

          THE IMPORTANCE OF EFFECTIVE PAIN MANAGEMENT, AND THE PAIN

          ASSESSMENT PROCESS?YES

           

           

          ADVANCE DIRECTIVES

          HEALTH CARE PROXY?YES

          NAME OF HCP SENA PAUL

          CONTACT # FOR [phone] AND ASK FOR SENA

          DO YOU HAVE A COPY WITH YOU?NO STATES HAS ALREADY BROUGHT COPY IN

          FOR SCANNING AT 2017 HOSPITALIZATION.

          DO YOU HAVE A DNR?NO

          WOULD YOU LIKE MORE INFORMATION?NO

          LIVING WILL?NO

          WOULD YOU LIKE MORE INFORMATION?NO

          POWER OF ?NO

          WOULD YOU LIKE MORE INFORMATION?NO

           

HOSPITALIZATION/MAJOR DIAGNOSTIC PROCEDURE

           

          SURGERY RELATED

          ASTHMA

           

REVIEW OF SYSTEMS

           

      REVIEWED BY:

           

          PROVIDER:    _____ .

           

      CONSTITUTIONAL:

           

          ANY CHANGE IN YOUR MEDICAL CONDITION?    NO . CHILLS    NO .

          FEVER    NO .

           

      INFECTION:

           

          DO YOU HAVE NEW INFECTIONS?    NO . DO YOU HAVE HISTORY OF MRSA? 

            NO .

           

      MUSCULOSKELETAL:

           

          ANY NEW PATTERNS OF PAIN OR NUMBNESS?    NO .

           

      GASTROENTEROLOGY:

           

          ANY NEW CHANGE IN BOWEL CONTROL?    NO .

           

      GENITOURINARY:

           

          ANY NEW CHANGE IN BLADDER CONTROL?    NO . IS THERE A CHANCE YOU

          COULD BE PREGNANT?    NO .

           

      HEMATOLOGY/LYMPH:

           

          DO YOU TAKE ANY BLOOD THINNERS? (FOR EXAMPLE- COUMADIN, PLAVIX,

          AGGRENOX, PLATEL, PRADAXA, OR XARELTO)    NO . WHEN WAS YOUR LAST

          DOSE?    DATE: TIME:  .

           

      NEUROLOGY:

           

          HAVE YOU FALLEN IN THE PAST 6 MONTHS?    NO . ANY NEW EXTREMITY

          NUMBNESS OR WEAKNESS?    NO .

           

      CARDIOLOGY:

           

          DO YOU HAVE A PACEMAKER OR DEFIBRILLATOR?    NO .

           

      RESPIRATORY:

           

          HAVE YOU BEEN SICK IN THE PAST WEEK?    NO . FEVER    NO . FLU

          LIKE SYMPTOMS?    NO . COUGH    NO .

           

      INTEGUMENTARY:

           

          DO YOU HAVE ANY RASHES OR OPEN SORES?    NO .

           

      ALLERGIC/IMMUNO:

           

          ARE YOU ALLERGIC TO SHELLFISH OR IV DYE?    NO . ANY NEW

          ALLERGIES?    NO .

           

      PSYCHIATRIC:

           

          DO YOU HAVE THOUGHTS OF HURTING YOURSELF OR SOMEONE ELSE?    NO .

          ARE YOU ABUSED, NEGLECTED, OR IN AN UNSAFE ENVIRONMENT?    NO .

           

      ENDOCRINOLOGY:

           

          ARE YOU DIABETIC?    NO .

           

      OTHER:

           

          DO YOU NEED ANY PRESCRIPTIONS?    NO . IF YES, PLEASE LIST:   

          ____ . ANY NEW PROBLEMS WITH YOUR MEDICATIONS?    NO . WHEN DID

          YOU LAST EAT?    0800 . WHEN DID YOU LAST DRINK?    0800 . WHAT

          DID YOU LAST DRINK?    WATER . NAME OF PERSON DRIVING YOU HOME?  

           SENA . DO YOU HAVE ANY OTHER QUESTIONS OR CONCERNS    NO .

           

VITAL SIGNS

           

           LBS, HT 65", BMI 38.44 INDEX, /71 MM HG, HR 70 /MIN,

          RR 18 /MIN, TEMP 98.9 F, OXYGEN SAT % 94%, REVIEWED BY: LS.

           

ASSESSMENTS

           

          SPONDYLOSIS OF LUMBAR REGION WITHOUT MYELOPATHY OR RADICULOPATHY

          - M47.816 (PRIMARY)

           

          SPONDYLOSIS OF LUMBOSACRAL REGION WITHOUT MYELOPATHY OR

          RADICULOPATHY - M47.817

           

PROCEDURES

           

      PN RADIOFREQUENCY

          PRE PROCEDURE DIAGNOSES    1. LUMBAR SPONDYLOSIS. 2. LUMBOSACRAL

          SPONDYLOSIS

          POST PROCEDURE DIAGNOSES    1. LUMBAR SPONDYLOSIS. 2. LUMBOSACRAL

          SPONDYLOSIS

          PROCEDURE    RIGHT L4-L5 AND RIGHT L5-S1 LUMBAR FACET

          RADIOFREQUENCY

          SURGEON    DR. PEDRO HERNÁNDEZ

          ASSISTANT    NONE

          ANESTHESIA    LOCAL

          PRE PROCEDURE REPORT    THE PATIENT HAS HISTORY OF CHRONIC LOW

          BACK PAIN. I EVALUATE THE PATIENT AND REVIEWED THE CHART. I WENT

          OVER THE RISKS, ALTERNATIVES, AND BENEFITS ASSOCIATED WITH THIS

          PROCEDURE. THE PATIENT WOULD LIKE TO PROCEED AND GIVE CONSENT TO

          PERFORMED THE PROCEDURE. THE PATIENT DENIES UNEXPLAINABLE WEIGHT

          LOSS, FEVER, CHILLS, OR NEW CHANGES IN URINARY OR BOWEL CONTROL

          DESCRIPTION OF PROCEDURE    THE PATIENT WAS BROUGHT TO THE

          PROCEDURE ROOM AND PLACED IN THE PRONE POSITION. THE LUMBOSACRAL

          AREA WAS CLEANED WITH CHLORAPREP SOLUTION AND DRAPED ASEPTICALLY.

          THE PROCEDURE WAS DONE UNDER STERILE CONDITIONS. I CHECKED

          LATERALITY AND THE LEVEL WHERE THE PROCEDURE WAS GOING TO BE

          PERFORMED WITH THE PATIENT AND THE SUPPORTING STAFF AT THE MOMENT

          OF THE TIME OUT IN THE PROCEDURE ROOM. UNDER FLUOROSCOPIC

          GUIDANCE, TARGETS WERE SELECTED AT THE INTERSECTION OF THE RIGHT

          TRANSVERSE PROCESS OF L4, L5 AND ALA OF S1 WITH ITS RESPECTIVE

          SUPERIOR ARTICULAR PROCESS. LIDOCAINE WAS USED TO NUMB THE SKIN

          AND THE SUBCUTANEOUS TISSUE BELOW IT. RADIOFREQUENCY NEEDLES

          22-GAUGE 15 CM LONG WITH 10 MM ACTIVE CURVE TIP WERE ADVANCED

          UNDER FLUOROSCOPIC GUIDANCE AND FOLLOWING PATIENT FEEDBACK UNTIL

          THE TARGET AREA WAS REACHED. POSITION OF THE NEEDLES WAS VERIFIED

          WITH AP AND LATERAL VIEWS. AFTER PROPER POSITION OF THE NEEDLE

          WAS ACHIEVED, WE WORKED WITH THE RIGHT SELECTED MEDIAN BRANCHES

          OF L3, L4 AND THE DORSAL RAMI OF L5. WE MEASURED THE

          CORRESPONDING IMPEDANCES, SENSORY STIMULATION AND MOTOR RESPONSES

          AS INDICATED IN THE RADIOFREQUENCY WORK SHEET. POSITION OF THE

          NEEDLES WAS VERIFIED AGAIN WITH AP AND LATERAL VIEWS. LIDOCAINE

          1%, 2 ML, WAS INJECTED AT EACH LEVEL. RADIOFREQUENCY WAS DONE AT

          EACH LEVEL AT 80 DEGREES FOR 90 SECONDS. AFTER RADIOFREQUENCY WAS

          DONE, THE PATIENT RECEIVED BUPIVACAINE 0.125% 1 CC WITH KENALOG 5

          MG AT EACH SITE. THERE WAS NO EVIDENCE OF BLOOD, PARESTHESIA OR

          CEREBROSPINAL FLUID DURING THE PROCEDURE. THE PATIENT WAS SENT TO

          THE RECOVERY ROOM. THE PATIENT WAS MOVING THE EXTREMITIES AND

          DOING WELL. THERE WAS NO COMPLICATION DURING THE PROCEDURE.

          FLUOROSCOPY TIME WAS 47 SECONDS

          POST PROCEDURE NOTE    THE PATIENT WILL BE SEEN IN A FOLLOW UP IN

          THE NEXT FEW WEEKS. INSTRUCTIONS WERE GIVEN, QUESTIONS WERE

          ANSWERED, AND THE PATIENT EXPRESSED UNDERSTANDING AND AGREES WITH

          THE PLAN. I, ANMOL DE LOS SANTOS, DOCUMENTED THE ABOVE INFORMATION

          ACTING AS A SCRIBE FOR DR. HERNÁNDEZ. I HAVE REVIEWED THE ABOVE

          DOCUMENT, WRITTEN BY ANMOL VILLEGAS AND I VERIFY THAT IT

          IS ACCURATE

           

DIAGNOSTIC IMAGING

           

          [facility] FACET BLOCK (PAIN)[phone]

           

PROCEDURE CODES

           

          95830 DESTROY LUMB/SAC FACET JNT, MODIFIERS: RT

           

          98505 DESTROY L/S FACET JNT ADDL, MODIFIERS: RT

           

          6045F RADXPS IN END [id number] PXD

           

DISPOSITION & COMMUNICATION

           

FOLLOW UP

           

          3 WEEKS

           

 

ELECTRONICALLY SIGNED BY PEDRO HERNÁNDEZ MD ON

          10/09/2017 AT 03:34 PM EDT

           

           

           

 

DISCLAIMER :

THIS IS A VISIT SUMMARY EXTRACTED FROM THE [employer] CHART.

IT IS NOT A COPY OF THE [employer] PROGRESS NOTE.

AGUSTINA

## 2017-10-11 ENCOUNTER — HOSPITAL ENCOUNTER (OUTPATIENT)
Dept: HOSPITAL 53 - M PAIN | Age: 62
End: 2017-10-11
Attending: NURSE PRACTITIONER
Payer: COMMERCIAL

## 2017-10-11 DIAGNOSIS — Z79.899: ICD-10-CM

## 2017-10-11 DIAGNOSIS — Z88.0: ICD-10-CM

## 2017-10-11 DIAGNOSIS — E11.9: ICD-10-CM

## 2017-10-11 DIAGNOSIS — Z91.018: ICD-10-CM

## 2017-10-11 DIAGNOSIS — M47.816: ICD-10-CM

## 2017-10-11 DIAGNOSIS — Z91.040: ICD-10-CM

## 2017-10-11 DIAGNOSIS — J44.9: ICD-10-CM

## 2017-10-11 DIAGNOSIS — Z88.1: ICD-10-CM

## 2017-10-11 DIAGNOSIS — M19.90: ICD-10-CM

## 2017-10-11 DIAGNOSIS — M47.817: ICD-10-CM

## 2017-10-11 DIAGNOSIS — L23.0: ICD-10-CM

## 2017-10-11 DIAGNOSIS — G89.29: Primary | ICD-10-CM

## 2017-10-11 DIAGNOSIS — M54.5: ICD-10-CM

## 2017-10-11 DIAGNOSIS — Z88.2: ICD-10-CM

## 2017-10-13 NOTE — ECWPNPC
PATIENT NAME: JERRY MILLER

: 1955

GENDER: FEMALE

MRN: [id number]

VISIT DATE: 10/11/2017

DISCHARGE DATE: 10/11/17 1009

VISIT LOCKED DATE TIME: 

PHYSICIAN: MIKE LIZAMA  

RESOURCE: MIKE LIZAMA  

 

           

           

REASON FOR APPOINTMENT

           

          1. POST PROC

           

HISTORY OF PRESENT ILLNESS

           

      HISTORY OF PRESENT ILLNESS:

      PAIN

           

           

          THE PATIENT DESCRIBES THE PAIN...

           

           HERE FOR POST PROC. F/U. HAD RADIOFREQUENCY RIGHTL5/S1 ON

          17.REPORTS > 50% IMPROVEMENT IN PAIN POST PROCEDURE THAT

          CONTINUES TODAY . RATING PAIN VAS 1/10.PAIN IS LOCATED ACROSS LOW

          BACK AND IS INTERMITTENT AND DESCRIBED AS ACHING.HAS NOT NEEDED

          TO TAKE PAIN MEDICATION SINCE PROCEDURE. PAIN AGGREVATED BY

          WEATHER CHANGE.THIS IS CHRONIC PAIN RELATED TO WORK RELATED

          INJURY DOI:2007.CURRENTLY USING TRAMADOL 50MG Q8H

          PRN,SKELAXIN 800MG Q12H PRN AND CYMBALTA 30MG BID FOR CHRONIC

          PAIN.

           

      FALL RISK SCREENING:

      SCREENING

           

           

          :NO FALLS IN THE PAST YEAR

           

CURRENT MEDICATIONS

           

          TAKING ALLOPURINOL 300 MG TABLET 1 TABLET ORALLY ONCE A DAY

          TAKING SINGULAIR 10 MG TABLET 1 TABLET IN THE EVENING ORALLY ONCE

          A DAY

          TAKING BENTYL 20 MG TABLET 1 TABLET ORALLY EVERY 4 HOURS AS

          NEEDED

          TAKING VENTOLIN  (90 BASE) MCG/ACT AEROSOL SOLUTION 2

          PUFFS INHALATION AS NEEDED

          TAKING VITAMIN B COMPLEX CAPSULE AS DIRECTED ORALLY DAILY

          TAKING HYDROCHLOROTHIAZIDE 25 25 MG TABLET AS DIRECTED ORAL DAILY

          TAKING LATANOPROST 0.005 % SOLUTION 1 DROP INTO BOTH EYES EVENING

          OPHTHALMIC ONCE A DAY

          TAKING SYMBICORT 80-4.5 MCG/ACT AEROSOL 2 PUFFS INHALATION TWICE

          A DAY

          TAKING PREMARIN 0.625 MG/GM CREAM VAGINAL THREE TIMES A WEEK

          TAKING TYLENOL 500 MG TABLET 1 TABLET AS NEEDED ORALLY EVERY 6

          HRS

          TAKING BENTYL 10 MG CAPSULE 1 CAPSULE ORALLY TWO TIMES A DAY

          TAKING REFRESH CELLUVISC 1 % SOLUTION 1 DROP OU OPHTHALMIC 24

          TIME(S) A DAY

          TAKING DIFLUCAN 200 MG TABLET 1 TABLET ORALLY AS DIRECTED

          TAKING FIBER 58.6 % POWDER 2 CAPSULES WITH 8 OUNCES OF LIQUID

          ORALLY DAILY

          TAKING CRANBERRY 405 MG CAPSULE ORALLY DAILY

          TAKING FLONASE ALLERGY RELIEF 50 MCG/ACT SUSPENSION 1 SPRAY IN

          EACH NOSTRIL NASALLY ONCE A DAY AS NEEDED

          TAKING OXYBUTYNIN CHLORIDE 5 MG TABLET 1 TABLET ORALLY 2 TIMES A

          DAY

          TAKING ALPHA LIPOIC ACID 100 MG CAPSULE 1 CAP ORALLY DAILY

          TAKING POTASSIUM CHLORIDE 10 MEQ CAPSULE EXTENDED RELEASE 1

          CAPSULE WITH FOOD ORALLY ONCE A DAY

          TAKING PROBIOTIC - CAPSULE ORALLY DAILY

          TAKING OMEPRAZOLE 20 MG CAPSULE DELAYED RELEASE 1 CAP ORALLY ONCE

          A DAY AS NEEDED

          TAKING ALLERX 1 DROP BOTH EYES DAILY

          TAKING TRAMADOL HCL 50 MG TABLET 1 TABLET AS NEEDED ORALLY Q8H

          PRN MDD3

          TAKING CYMBALTA 30 MG CAPSULE DELAYED RELEASE PARTICLES 1 CAPSULE

          ORALLY FOR PAIN TWICE A DAY MDD2

          TAKING GABAPENTIN 300 MG CAPSULE 1 CAPSULE ORALLY SPECIAL FUNDS

          TWO TIMES A DAY FOR PAINMDD2

          TAKING SKELAXIN 800 MG TABLET 1 TABLET ORALLY BID PRN FOR SPASMS

          AND PAIN MDD 2

          TAKING CABERGOLINE 0.5 MG TABLET 0.5 TABLET ORALLY TWICE A WEEK

          (TAKES 0.05 MG 1/2 TABLET)

          NOT-TAKING ACETAMINOPHEN 500 MG TABLET 2 TABLETS AS NEEDED ORALLY

          EVERY 6 HRS FOR PAIN MDD4

          NOT-TAKING XIIDRA 5 % SOLUTION 1 DROP INTO AFFECTED EYE

          OPHTHALMIC TWICE A DAY

          NOT-TAKING MONOVISC 88 MG/4ML SOLUTION PREFILLED SYRINGE

          INTRA-ARTICULAR EVERY 6 MONTHS, NOTES: EVERY 6 MO (LAST 16)

          NOT-TAKING RHINOCORT AQUA 32 MCG/ACT SUSPENSION 1 SPRAY IN EACH

          NOSTRIL NASALLY AS NEEDED

          NOT-TAKING OXYBUTYNIN CHLORIDE ER 10 MG TABLET EXTENDED RELEASE

          24 HOUR 1 TABLET ORALLY BID

          NOT-TAKING ULTRAM 50 MG TABLET 1 TABLET AS NEEDED FOR PAIN ORALLY

          EVERY 6 HRS MDD1

          NOT-TAKING NASACORT ALLERGY 24HR 55 MCG/ACT AEROSOL 1 PUFF IN

          EACH NOSTRIL NASALLY ONCE A DAY

          NOT-TAKING CELEBREX 100 MG CAPSULE 1 CAPSULE ORALLY TWICE A DAY

          NOT-TAKING VITAMIN C 500 MG TABLET AS DIRECTED ORALLY DAILY,

          NOTES: 16 0900

          MEDICATION LIST REVIEWED AND RECONCILED WITH THE PATIENT

           

PAST MEDICAL HISTORY

           

          DIABETES

          BLOOD PRESSURE

          ASTHMA

          PITUITARY ADENOMA

          ARTHRITIS

          RUPTURED DISC/BACK PAIN

          IBS WITH DIARRHEA

          OPTIC DAMAGE DUE TO HYPERTENSION

          CHRONIC SINUSITIS

           

ALLERGIES

           

          PENICILLIN (FOR ALLERGIES USE ONLY): RASH: ALLERGY

          SULFA (FOR ALLERGY USE ONLY): RASH: ALLERGY

          PHENERGAN: BLOOD CLOT: SIDE EFFECTS

          LATEX CONDOMS, RUBBER BANDS, GLOVES: RASH: ALLERGY

          ERYTHROMYCIN: RASH: ALLERGY

          NICLKEL: RASH: ALLERGY

          SULFITES: DYSPNEA: ALLERGY

          WHEAT: GI UPSET, WATERY EYES AND SINUS CONGERSTION: SIDE EFFECTS

           

SOCIAL HISTORY

           

          GENERAL:

           

          TOBACCO USE

          ARE YOU A:NONSMOKER

           

           

          ALCOHOL SCREENING

          DID YOU HAVE A DRINK CONTAINING ALCOHOL IN THE PAST YEAR?NO

          POINTS0

          INTERPRETATIONNEGATIVE

           

           

          Advent

          [id number]

           

           

          LANGUAGE

          LANGUAGES SPOKEN:ENGLISH

           

           

          LEARNING BARRIERS / SPECIAL NEEDS

          CHANGE FROM LAST VISIT?NO

          BARRIERS TO LEARNING?NO

          HEARING IMPAIRED?NO

          VISION IMPAIRED?YES ENLARGED OPTIC NERVE RELATED TO GESTATIONAL

          HYPERTENSION.

          :CORRECTIVE LENSES

          COGNITIVELY IMPAIRED?NO

          READINESS TO LEARN?YES

          LEARNING PREFERENCES?NO

          LEARNING CAPABILITIES PRESENT?YES

          EMOTIONAL BARRIERS?NO

          SPECIAL DEVICES?NO

           NEEDED?NO

           

           

          NEW PATIENT PAIN DIARY

          TODAY'S VISITNOTES

          FROM 0-10, WHAT LEVEL IS YOUR PAIN TODAY?0

           

           

          PAIN CLINIC PFS, CLERGY, PUBLIC HEALTH REFERRALS

          PFS REFERRAL NEEDED?NO

          CLERGY REFERRAL NEEDED?NO

          PUBLIC HEALTH REFERRAL NEEDED?NO

          WAS THE PROVIDER NOTIFIED OF ANY PERTINENT INFO?NO

          HAS THE PATIENT BEEN EDUCATED REGARDING HIS/HER PLAN OF CARE?YES

          HAS THE PATIENT BEEN EDUCATED REGARDING PAIN, THE RISK FOR PAIN,

          THE IMPORTANCE OF EFFECTIVE PAIN MANAGEMENT, AND THE PAIN

          ASSESSMENT PROCESS?YES

           

           

          ADVANCE DIRECTIVES

          HEALTH CARE PROXY?YES

          NAME OF HCP SENA MILLER

          CONTACT # FOR [phone] AND ASK FOR SENA

          DO YOU HAVE A COPY WITH YOU?NO STATES HAS ALREADY BROUGHT COPY IN

          FOR SCANNING AT 2017 HOSPITALIZATION.

          DO YOU HAVE A DNR?NO

          WOULD YOU LIKE MORE INFORMATION?NO

          LIVING WILL?NO

          WOULD YOU LIKE MORE INFORMATION?NO

          POWER OF ?NO

          WOULD YOU LIKE MORE INFORMATION?NO

           

REVIEW OF SYSTEMS

           

      REVIEWED BY:

           

          PROVIDER:    MIKE WESLEY .

           

      CONSTITUTIONAL:

           

          ANY CHANGE IN YOUR MEDICAL CONDITION?    NO . CHILLS    NO .

          FEVER    NO .

           

      INFECTION:

           

          DO YOU HAVE NEW INFECTIONS?    NO . DO YOU HAVE HISTORY OF MRSA? 

            NO .

           

      MUSCULOSKELETAL:

           

          ANY NEW PATTERNS OF PAIN OR NUMBNESS?    NO .

           

      GASTROENTEROLOGY:

           

          ANY NEW CHANGE IN BOWEL CONTROL?    NO .

           

      GENITOURINARY:

           

          ANY NEW CHANGE IN BLADDER CONTROL?    NO . IS THERE A CHANCE YOU

          COULD BE PREGNANT?    NO .

           

      HEMATOLOGY/LYMPH:

           

          DO YOU TAKE ANY BLOOD THINNERS? (FOR EXAMPLE- COUMADIN, PLAVIX,

          AGGRENOX, PLATEL, PRADAXA, OR XARELTO)    NO . WHEN WAS YOUR LAST

          DOSE?    DATE: TIME:  .

           

      NEUROLOGY:

           

          HAVE YOU FALLEN IN THE PAST 6 MONTHS?    NO . ANY NEW EXTREMITY

          NUMBNESS OR WEAKNESS?    NO .

           

      CARDIOLOGY:

           

          DO YOU HAVE A PACEMAKER OR DEFIBRILLATOR?    NO .

           

      RESPIRATORY:

           

          HAVE YOU BEEN SICK IN THE PAST WEEK?    YES, BRONCHITIS . FEVER  

           NO . FLU LIKE SYMPTOMS?    NO . COUGH    NO .

           

      INTEGUMENTARY:

           

          DO YOU HAVE ANY RASHES OR OPEN SORES?    NO .

           

      ALLERGIC/IMMUNO:

           

          ARE YOU ALLERGIC TO SHELLFISH OR IV DYE?    NO . ANY NEW

          ALLERGIES?    NO .

           

      PSYCHIATRIC:

           

          DO YOU HAVE THOUGHTS OF HURTING YOURSELF OR SOMEONE ELSE?    NO .

          ARE YOU ABUSED, NEGLECTED, OR IN AN UNSAFE ENVIRONMENT?    NO .

           

      ENDOCRINOLOGY:

           

          ARE YOU DIABETIC?    NO .

           

      OTHER:

           

          DO YOU NEED ANY PRESCRIPTIONS?    NO . IF YES, PLEASE LIST:   

          ____ . ANY NEW PROBLEMS WITH YOUR MEDICATIONS?    NO . WHEN DID

          YOU LAST EAT?    ____ . WHEN DID YOU LAST DRINK?    ____ . WHAT

          DID YOU LAST DRINK?    ____ . NAME OF PERSON DRIVING YOU HOME?   

          ____ . DO YOU HAVE ANY OTHER QUESTIONS OR CONCERNS    NO .

           

VITAL SIGNS

           

           LBS, HT 65", BMI 39.10 INDEX, /66 MM HG, HR 95 /MIN,

          RR 18 /MIN, TEMP 96.4 F, OXYGEN SAT % 97%, SAFE IN ENV? (Y/N)

          YES, NA INITIALS SC 09:30, REVIEWED BY: JUAN.

           

EXAMINATION

           

      GENERAL EXAMINATION:

          LUNGS:LUNG SOUNDS ARE CLEAR.

           

          HEART:HEART RATE REGULAR.

           

          MUSCULOSKELETAL:*, MUSCLE STRENGTH TESTING 5/5 BILATERAL,

          PALPATION: POSITIVE FOR PAIN OVER L/S SPINE. POSITIVE FOR PAIN

          OVER L/S PARSPINALS.

           

ASSESSMENTS

           

          LOW BACK PAIN OF OVER 3 MONTHS DURATION - M54.5 (PRIMARY)

           

          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

           

TREATMENT

           

      LOW BACK PAIN OF OVER 3 MONTHS DURATION

          CONTINUE TRAMADOL HCL TABLET, 50 MG, 1 TABLET AS NEEDED, ORALLY,

          Q8H PRN MDD3

          CONTINUE CYMBALTA CAPSULE DELAYED RELEASE PARTICLES, 30 MG, 1

          CAPSULE, ORALLY FOR PAIN, TWICE A DAY MDD2

          CONTINUE GABAPENTIN CAPSULE, 300 MG, 1 CAPSULE, ORALLY SPECIAL

          FUNDS, TWO TIMES A DAY FOR PAINMDD2

          CONTINUE SKELAXIN TABLET, 800 MG, 1 TABLET, ORALLY, BID PRN FOR

          SPASMS AND PAIN MDD 2

           

PROCEDURES

           

      PN WORKMANS' COMP OPINION

          IN YOUR OPINION, WAS THE INCIDENT THAT THE PATIENT DESCRIBED THE

          COMPETENT MEDICAL CAUSE OF THIS INJURY/ILLNESS?    YES

          ARE THE PATIENT'S COMPLAINTS CONSISTENT WITH HIS/HER HISTORY OF

          THE INJURY/ILLNESS?    YES

          IS THE PATIENT'S HISTORY OF THE INJURY/ILLNESS CONSISTENT WITH

          YOUR OBJECTIVE FINDING?    YES

          WHAT IS THE PERCENTAGE OF TEMPORARY IMPAIRMENT?    MODERATE TO

          MARKED = 66.7%

          IS THE PATIENT WORKING?    YES

          DOCTOR ON SITE:    PEDRO MILLER MD

           

PROCEDURE CODES

           

           ESTABILISHED PATIENT Formerly [facility] CHARGE

           

DISPOSITION & COMMUNICATION

           

FOLLOW UP

           

          2 MONTHS

           

 

ELECTRONICALLY SIGNED BY LUPILLO VERDUGO ON

          10/12/2017 AT 09:26 AM EDT

           

           

           

 

DISCLAIMER :

THIS IS A VISIT SUMMARY EXTRACTED FROM THE [employer] CHART.

IT IS NOT A COPY OF THE [employer] PROGRESS NOTE.

AGUSTINA

## 2017-12-13 ENCOUNTER — HOSPITAL ENCOUNTER (OUTPATIENT)
Dept: HOSPITAL 53 - M PAIN | Age: 62
End: 2017-12-13
Attending: NURSE PRACTITIONER
Payer: COMMERCIAL

## 2017-12-13 DIAGNOSIS — I10: ICD-10-CM

## 2017-12-13 DIAGNOSIS — Z79.891: ICD-10-CM

## 2017-12-13 DIAGNOSIS — Z79.899: ICD-10-CM

## 2017-12-13 DIAGNOSIS — G89.29: Primary | ICD-10-CM

## 2017-12-13 DIAGNOSIS — M54.5: ICD-10-CM

## 2017-12-13 DIAGNOSIS — Z88.2: ICD-10-CM

## 2017-12-13 DIAGNOSIS — Z88.0: ICD-10-CM

## 2017-12-13 DIAGNOSIS — E11.9: ICD-10-CM

## 2017-12-13 DIAGNOSIS — M48.9: ICD-10-CM

## 2017-12-13 DIAGNOSIS — J45.909: ICD-10-CM

## 2017-12-13 DIAGNOSIS — Z91.018: ICD-10-CM

## 2017-12-13 DIAGNOSIS — Z88.1: ICD-10-CM

## 2017-12-13 DIAGNOSIS — L23.0: ICD-10-CM

## 2017-12-13 DIAGNOSIS — Z91.040: ICD-10-CM

## 2017-12-13 NOTE — ECWPNPC
PATIENT NAME: JERRY MILLER

: 1955

GENDER: FEMALE

MRN: [id number]

VISIT DATE: 2017

DISCHARGE DATE: 17 1403

VISIT LOCKED DATE TIME: 

PHYSICIAN: MIKE LIZAMA  

RESOURCE: MIKE LIZAMA  

 

           

           

REASON FOR APPOINTMENT

           

          1. W/C, BACK

           

HISTORY OF PRESENT ILLNESS

           

      HISTORY OF PRESENT ILLNESS:

      PAIN

           

           

          THE PATIENT DESCRIBES THE PAIN...

           

           HERE FO F/U OF CHRONIC LOW BACK PAIN.HAS HAD PT LAST MONTH THAT

          WAS HELPFUL.USING LESS TYLENOL AND HAVING IMPROVED ACTIVITY

          TOLERANCE.CONTINUES TO REPORT SIGNIFICANT LOW BACK PAIN WITH ROJM

          OF SPINE AND PT IS SUGGESTING SHE CONTINUE TO HAVE PT X 6 MORE

          WEEKS. HAD RADIOFREQUENCY RIGHTL5/S1 ON 17.REPORTS > 50%

          IMPROVEMENT IN PAIN POST PROCEDURE THAT CONTINUES TODAY . RATING

          PAIN VAS 3/10.PAIN IS LOCATED ACROSS LOW BACK AND IS INTERMITTENT

          AND DESCRIBED AS ACHING.HAS NOT NEEDED TO TAKE PAIN MEDICATION

          SINCE PROCEDURE. PAIN AGGREVATED BY WEATHER CHANGE.THIS IS

          CHRONIC PAIN RELATED TO WORK RELATED INJURY

          DOI:2007.CURRENTLY USING TRAMADOL 50MG Q8H PRN,SKELAXIN

          800MG Q12H PRN AND CYMBALTA 30MG BID FOR CHRONIC PAIN.

           

      FALL RISK SCREENING:

      SCREENING

           

           

          :NO FALLS IN THE PAST YEAR

           

CURRENT MEDICATIONS

           

          TAKING ALLOPURINOL 300 MG TABLET 1 TABLET ORALLY ONCE A DAY

          TAKING SINGULAIR 10 MG TABLET 1 TABLET IN THE EVENING ORALLY ONCE

          A DAY

          TAKING BENTYL 20 MG TABLET 1 TABLET ORALLY EVERY 4 HOURS AS

          NEEDED

          TAKING VENTOLIN  (90 BASE) MCG/ACT AEROSOL SOLUTION 2

          PUFFS INHALATION AS NEEDED

          TAKING VITAMIN B COMPLEX CAPSULE AS DIRECTED ORALLY DAILY

          TAKING HYDROCHLOROTHIAZIDE 25 25 MG TABLET AS DIRECTED ORAL DAILY

          TAKING LATANOPROST 0.005 % SOLUTION 1 DROP INTO BOTH EYES EVENING

          OPHTHALMIC ONCE A DAY

          TAKING SYMBICORT 80-4.5 MCG/ACT AEROSOL 2 PUFFS INHALATION TWICE

          A DAY

          TAKING PREMARIN 0.625 MG/GM CREAM VAGINAL THREE TIMES A WEEK

          TAKING TYLENOL 500 MG TABLET 1 TABLET AS NEEDED ORALLY EVERY 6

          HRS

          TAKING BENTYL 10 MG CAPSULE 1 CAPSULE ORALLY TWO TIMES A DAY

          TAKING REFRESH CELLUVISC 1 % SOLUTION 1 DROP OU OPHTHALMIC 24

          TIME(S) A DAY

          TAKING DIFLUCAN 200 MG TABLET 1 TABLET ORALLY AS DIRECTED

          TAKING FIBER 58.6 % POWDER 2 CAPSULES WITH 8 OUNCES OF LIQUID

          ORALLY DAILY

          TAKING CRANBERRY 405 MG CAPSULE ORALLY DAILY

          TAKING FLONASE ALLERGY RELIEF 50 MCG/ACT SUSPENSION 1 SPRAY IN

          EACH NOSTRIL NASALLY ONCE A DAY AS NEEDED

          TAKING OXYBUTYNIN CHLORIDE 5 MG TABLET 1 TABLET ORALLY 2 TIMES A

          DAY

          TAKING ALPHA LIPOIC ACID 100 MG CAPSULE 1 CAP ORALLY DAILY

          TAKING POTASSIUM CHLORIDE 10 MEQ CAPSULE EXTENDED RELEASE 1

          CAPSULE WITH FOOD ORALLY ONCE A DAY

          TAKING PROBIOTIC - CAPSULE ORALLY DAILY

          TAKING OMEPRAZOLE 20 MG CAPSULE DELAYED RELEASE 1 CAP ORALLY ONCE

          A DAY AS NEEDED

          TAKING ALLERX 1 DROP BOTH EYES DAILY

          TAKING CABERGOLINE 0.5 MG TABLET 0.5 TABLET ORALLY TWICE A WEEK

          (TAKES 0.05 MG 1/2 TABLET)

          TAKING TRAMADOL HCL 50 MG TABLET 1 TABLET AS NEEDED ORALLY Q8H

          PRN MDD3

          TAKING CYMBALTA 30 MG CAPSULE DELAYED RELEASE PARTICLES 1 CAPSULE

          ORALLY FOR PAIN TWICE A DAY MDD2

          TAKING GABAPENTIN 300 MG CAPSULE 1 CAPSULE ORALLY SPECIAL FUNDS

          TWO TIMES A DAY FOR PAINMDD2

          TAKING SKELAXIN 800 MG TABLET 1 TABLET ORALLY BID PRN FOR SPASMS

          AND PAIN MDD 2

          TAKING XIIDRA 5 % SOLUTION 1 DROP INTO AFFECTED EYE OPHTHALMIC

          TWICE A DAY

          NOT-TAKING ACETAMINOPHEN 500 MG TABLET 2 TABLETS AS NEEDED ORALLY

          EVERY 6 HRS FOR PAIN MDD4

          NOT-TAKING MONOVISC 88 MG/4ML SOLUTION PREFILLED SYRINGE

          INTRA-ARTICULAR EVERY 6 MONTHS, NOTES: EVERY 6 MO (LAST 16)

          NOT-TAKING RHINOCORT AQUA 32 MCG/ACT SUSPENSION 1 SPRAY IN EACH

          NOSTRIL NASALLY AS NEEDED

          NOT-TAKING OXYBUTYNIN CHLORIDE ER 10 MG TABLET EXTENDED RELEASE

          24 HOUR 1 TABLET ORALLY BID

          NOT-TAKING ULTRAM 50 MG TABLET 1 TABLET AS NEEDED FOR PAIN ORALLY

          EVERY 6 HRS MDD1

          NOT-TAKING NASACORT ALLERGY 24HR 55 MCG/ACT AEROSOL 1 PUFF IN

          EACH NOSTRIL NASALLY ONCE A DAY

          NOT-TAKING CELEBREX 100 MG CAPSULE 1 CAPSULE ORALLY TWICE A DAY

          NOT-TAKING VITAMIN C 500 MG TABLET AS DIRECTED ORALLY DAILY,

          NOTES: 16 0900

          MEDICATION LIST REVIEWED AND RECONCILED WITH THE PATIENT

           

PAST MEDICAL HISTORY

           

          DIABETES

          BLOOD PRESSURE

          ASTHMA

          PITUITARY ADENOMA

          ARTHRITIS

          RUPTURED DISC/BACK PAIN

          IBS WITH DIARRHEA

          OPTIC DAMAGE DUE TO HYPERTENSION

          CHRONIC SINUSITIS

           

ALLERGIES

           

          PENICILLIN (FOR ALLERGIES USE ONLY): RASH: ALLERGY

          SULFA (FOR ALLERGY USE ONLY): RASH: ALLERGY

          PHENERGAN: BLOOD CLOT: SIDE EFFECTS

          LATEX CONDOMS, RUBBER BANDS, GLOVES: RASH: ALLERGY

          ERYTHROMYCIN: RASH: ALLERGY

          NICLKEL: RASH: ALLERGY

          SULFITES: DYSPNEA: ALLERGY

          WHEAT: GI UPSET, WATERY EYES AND SINUS CONGERSTION: SIDE EFFECTS

           

SURGICAL HISTORY

           

          BTL 1987

          GB 1991

          ACL 2001

          RIGHT ROTATOR CUFF 2015

          LEFT TOTAL KNEE REPLACEMENT 2017

           

SOCIAL HISTORY

           

          GENERAL:

           

          TOBACCO USE  ARE YOU A: NONSMOKER .

           

           

          ALCOHOL SCREENING

          DID YOU HAVE A DRINK CONTAINING ALCOHOL IN THE PAST YEAR?NO

          POINTS0

          INTERPRETATIONNEGATIVE

           

           

          Latter day

          [id number] [employer]

           

           

          LANGUAGE

          LANGUAGES SPOKEN:ENGLISH

           

           

          LEARNING BARRIERS / SPECIAL NEEDS

          CHANGE FROM LAST VISIT?NO

          BARRIERS TO LEARNING?NO

          HEARING IMPAIRED?NO

          VISION IMPAIRED?YES ENLARGED OPTIC NERVE RELATED TO GESTATIONAL

          HYPERTENSION.

          :CORRECTIVE LENSES

          COGNITIVELY IMPAIRED?NO

          READINESS TO LEARN?YES

          LEARNING PREFERENCES?NO

          LEARNING CAPABILITIES PRESENT?YES

          EMOTIONAL BARRIERS?NO

          SPECIAL DEVICES?NO

           NEEDED?NO

           

           

          NEW PATIENT PAIN DIARY  TODAY'S VISIT NOTES, FROM 0-10, WHAT

          LEVEL IS YOUR PAIN TODAY? 0.

           

           

          PAIN CLINIC PFS, CLERGY, PUBLIC HEALTH REFERRALS

          PFS REFERRAL NEEDED?NO

          CLERGY REFERRAL NEEDED?NO

          PUBLIC HEALTH REFERRAL NEEDED?NO

          WAS THE PROVIDER NOTIFIED OF ANY PERTINENT INFO?NO

          HAS THE PATIENT BEEN EDUCATED REGARDING HIS/HER PLAN OF CARE?YES

          HAS THE PATIENT BEEN EDUCATED REGARDING PAIN, THE RISK FOR PAIN,

          THE IMPORTANCE OF EFFECTIVE PAIN MANAGEMENT, AND THE PAIN

          ASSESSMENT PROCESS?YES

           

           

          ADVANCE DIRECTIVES

          HEALTH CARE PROXY?YES

          NAME OF HCP SENA PAUL

          CONTACT # FOR [phone] AND ASK FOR SENA

          DO YOU HAVE A COPY WITH YOU?NO STATES HAS ALREADY BROUGHT COPY IN

          FOR SCANNING AT 2017 HOSPITALIZATION.

          DO YOU HAVE A DNR?NO

          WOULD YOU LIKE MORE INFORMATION?NO

          LIVING WILL?NO

          WOULD YOU LIKE MORE INFORMATION?NO

          POWER OF ?NO

          WOULD YOU LIKE MORE INFORMATION?NO

           

HOSPITALIZATION/MAJOR DIAGNOSTIC PROCEDURE

           

          SURGERY RELATED

          ASTHMA

           

REVIEW OF SYSTEMS

           

      REVIEWED BY:

           

          PROVIDER:    MIKE WESLEY .

           

      CONSTITUTIONAL:

           

          ANY CHANGE IN YOUR MEDICAL CONDITION?    NO . CHILLS    NO .

          FEVER    NO .

           

      INFECTION:

           

          DO YOU HAVE NEW INFECTIONS?    NO . DO YOU HAVE HISTORY OF MRSA? 

            NO .

           

      MUSCULOSKELETAL:

           

          ANY NEW PATTERNS OF PAIN OR NUMBNESS?    NO .

           

      GASTROENTEROLOGY:

           

          ANY NEW CHANGE IN BOWEL CONTROL?    NO .

           

      GENITOURINARY:

           

          ANY NEW CHANGE IN BLADDER CONTROL?    NO . IS THERE A CHANCE YOU

          COULD BE PREGNANT?    NO .

           

      HEMATOLOGY/LYMPH:

           

          DO YOU TAKE ANY BLOOD THINNERS? (FOR EXAMPLE- COUMADIN, PLAVIX,

          AGGRENOX, PLATEL, PRADAXA, OR XARELTO)    NO . WHEN WAS YOUR LAST

          DOSE?    DATE: TIME:  .

           

      NEUROLOGY:

           

          HAVE YOU FALLEN IN THE PAST 6 MONTHS?    NO . ANY NEW EXTREMITY

          NUMBNESS OR WEAKNESS?    NO .

           

      CARDIOLOGY:

           

          DO YOU HAVE A PACEMAKER OR DEFIBRILLATOR?    NO .

           

      RESPIRATORY:

           

          HAVE YOU BEEN SICK IN THE PAST WEEK?    NO . FEVER    NO . FLU

          LIKE SYMPTOMS?    NO . COUGH    NO .

           

      INTEGUMENTARY:

           

          DO YOU HAVE ANY RASHES OR OPEN SORES?    NO .

           

      ALLERGIC/IMMUNO:

           

          ARE YOU ALLERGIC TO SHELLFISH OR IV DYE?    NO . ANY NEW

          ALLERGIES?    NO .

           

      PSYCHIATRIC:

           

          DO YOU HAVE THOUGHTS OF HURTING YOURSELF OR SOMEONE ELSE?    NO .

          ARE YOU ABUSED, NEGLECTED, OR IN AN UNSAFE ENVIRONMENT?    NO .

           

      ENDOCRINOLOGY:

           

          ARE YOU DIABETIC?    NO .

           

      OTHER:

           

          DO YOU NEED ANY PRESCRIPTIONS?    YES, OMEPRAZOLE . IF YES,

          PLEASE LIST:    ____ . ANY NEW PROBLEMS WITH YOUR MEDICATIONS?   

          NO . WHEN DID YOU LAST EAT?    ____ . WHEN DID YOU LAST DRINK?   

          ____ . WHAT DID YOU LAST DRINK?    ____ . NAME OF PERSON DRIVING

          YOU HOME?    ____ . DO YOU HAVE ANY OTHER QUESTIONS OR CONCERNS  

           NO .

           

VITAL SIGNS

           

          .6 LBS, HT 65", BMI 38.04 INDEX, /68 MM HG, HR 68

          /MIN, RR 16 /MIN, TEMP 97.7 F, OXYGEN SAT % 98%, NA INITIALS TL

          1335, REVIEWED BY: EMPATIENT WEIGHED ON PMC SCALE- TL.

           

EXAMINATION

           

      GENERAL EXAMINATION:

          LUNGS:LUNG SOUNDS ARE CLEAR.

           

          HEART:HEART RATE REGULAR.

           

          MUSCULOSKELETAL:*, MUSCLE STRENGTH TESTING 5/5 BILATERAL,

          PALPATION: POSITIVE FOR PAIN OVER L/S SPINE. POSITIVE FOR PAIN

          OVER L/S PARSPINALS.

           

ASSESSMENTS

           

          LOW BACK PAIN OF OVER 3 MONTHS DURATION - M54.5 (PRIMARY)

           

          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

           

TREATMENT

           

      LOW BACK PAIN OF OVER 3 MONTHS DURATION

          CONTINUE TRAMADOL HCL TABLET, 50 MG, 1 TABLET AS NEEDED, ORALLY,

          Q8H PRN MDD3

          CONTINUE CYMBALTA CAPSULE DELAYED RELEASE PARTICLES, 30 MG, 1

          CAPSULE, ORALLY FOR PAIN, TWICE A DAY MDD2

          CONTINUE GABAPENTIN CAPSULE, 300 MG, 1 CAPSULE, ORALLY SPECIAL

          FUNDS, TWO TIMES A DAY FOR PAINMDD2

          CONTINUE SKELAXIN TABLET, 800 MG, 1 TABLET, ORALLY, BID PRN FOR

          SPASMS AND PAIN MDD 2

          NOTES: REQUEST PT 2XWK X 6 WK TO IMPROVE MUSCLE STRENGTH AND ROJM

          OF LOW BACK W/C-PATIENT WOULD LIKE TO ATTEND AT [facility] PT WHICH IS

          CLOSER TO HOME.

           

PROCEDURES

           

      PN WORKMANS' COMP OPINION

          IN YOUR OPINION, WAS THE INCIDENT THAT THE PATIENT DESCRIBED THE

          COMPETENT MEDICAL CAUSE OF THIS INJURY/ILLNESS?    YES

          ARE THE PATIENT'S COMPLAINTS CONSISTENT WITH HIS/HER HISTORY OF

          THE INJURY/ILLNESS?    YES

          IS THE PATIENT'S HISTORY OF THE INJURY/ILLNESS CONSISTENT WITH

          YOUR OBJECTIVE FINDING?    YES

          WHAT IS THE PERCENTAGE OF TEMPORARY IMPAIRMENT?    MODERATE TO

          MARKED = 66.7%

          IS THE PATIENT WORKING?    YES

          DOCTOR ON SITE:    PEDRO MILLER MD

           

PROCEDURE CODES

           

           ESTABILISHED PATIENT [facility] FACILITY CHARGE

           

DISPOSITION & COMMUNICATION

           

FOLLOW UP

           

          3 MONTHS

           

 

ELECTRONICALLY SIGNED BY LUPILLO VERDUGO ON

          2017 AT 02:07 PM EST

           

           

           

 

DISCLAIMER :

THIS IS A VISIT SUMMARY EXTRACTED FROM THE [employer] CHART.

IT IS NOT A COPY OF THE [employer] PROGRESS NOTE.

AGUSTINA

## 2018-03-28 ENCOUNTER — HOSPITAL ENCOUNTER (OUTPATIENT)
Dept: HOSPITAL 53 - M PAIN | Age: 63
End: 2018-03-28
Attending: NURSE PRACTITIONER
Payer: COMMERCIAL

## 2018-03-28 DIAGNOSIS — Z88.1: ICD-10-CM

## 2018-03-28 DIAGNOSIS — I10: ICD-10-CM

## 2018-03-28 DIAGNOSIS — M54.5: Primary | ICD-10-CM

## 2018-03-28 DIAGNOSIS — Z79.899: ICD-10-CM

## 2018-03-28 DIAGNOSIS — G89.29: ICD-10-CM

## 2018-03-28 DIAGNOSIS — J45.909: ICD-10-CM

## 2018-03-28 DIAGNOSIS — Z91.040: ICD-10-CM

## 2018-03-28 DIAGNOSIS — L23.0: ICD-10-CM

## 2018-03-28 DIAGNOSIS — Z88.8: ICD-10-CM

## 2018-03-28 DIAGNOSIS — Z91.011: ICD-10-CM

## 2018-03-28 DIAGNOSIS — M19.90: ICD-10-CM

## 2018-03-28 DIAGNOSIS — E11.9: ICD-10-CM

## 2018-03-28 DIAGNOSIS — Z88.2: ICD-10-CM

## 2018-03-28 DIAGNOSIS — Z88.0: ICD-10-CM

## 2018-04-25 ENCOUNTER — HOSPITAL ENCOUNTER (OUTPATIENT)
Dept: HOSPITAL 53 - M LRY | Age: 63
End: 2018-04-25
Attending: NURSE PRACTITIONER
Payer: COMMERCIAL

## 2018-04-25 DIAGNOSIS — L60.3: Primary | ICD-10-CM

## 2018-04-25 LAB
ALBUMIN/GLOBULIN RATIO: 1.22 (ref 1–1.93)
ALBUMIN: 3.9 GM/DL (ref 3.2–5.2)
ALKALINE PHOSPHATASE: 109 U/L (ref 45–117)
ALT SERPL W P-5'-P-CCNC: 34 U/L (ref 12–78)
AST SERPL-CCNC: 25 U/L (ref 7–37)
BILIRUB CONJ SERPL-MCNC: < 0.1 MG/DL (ref 0–0.2)
BILIRUBIN,TOTAL: 0.3 MG/DL (ref 0.2–1)
TOTAL PROTEIN: 7.1 GM/DL (ref 6.4–8.2)

## 2018-07-02 ENCOUNTER — HOSPITAL ENCOUNTER (OUTPATIENT)
Dept: HOSPITAL 53 - M PAIN | Age: 63
End: 2018-07-02
Attending: NURSE PRACTITIONER
Payer: COMMERCIAL

## 2018-07-02 DIAGNOSIS — Z91.011: ICD-10-CM

## 2018-07-02 DIAGNOSIS — Z79.899: ICD-10-CM

## 2018-07-02 DIAGNOSIS — Z91.09: ICD-10-CM

## 2018-07-02 DIAGNOSIS — M48.061: ICD-10-CM

## 2018-07-02 DIAGNOSIS — Z88.8: ICD-10-CM

## 2018-07-02 DIAGNOSIS — Z96.652: ICD-10-CM

## 2018-07-02 DIAGNOSIS — Z88.0: ICD-10-CM

## 2018-07-02 DIAGNOSIS — Z91.040: ICD-10-CM

## 2018-07-02 DIAGNOSIS — Z88.1: ICD-10-CM

## 2018-07-02 DIAGNOSIS — G89.29: ICD-10-CM

## 2018-07-02 DIAGNOSIS — M19.90: ICD-10-CM

## 2018-07-02 DIAGNOSIS — Z88.2: ICD-10-CM

## 2018-07-02 DIAGNOSIS — E11.9: ICD-10-CM

## 2018-07-02 DIAGNOSIS — M47.897: Primary | ICD-10-CM

## 2018-07-02 DIAGNOSIS — I10: ICD-10-CM

## 2018-07-02 DIAGNOSIS — J45.909: ICD-10-CM

## 2018-08-06 ENCOUNTER — HOSPITAL ENCOUNTER (OUTPATIENT)
Dept: HOSPITAL 53 - M PAIN | Age: 63
End: 2018-08-06
Attending: ANESTHESIOLOGY
Payer: COMMERCIAL

## 2018-08-06 DIAGNOSIS — Z88.0: ICD-10-CM

## 2018-08-06 DIAGNOSIS — Z91.040: ICD-10-CM

## 2018-08-06 DIAGNOSIS — Z91.048: ICD-10-CM

## 2018-08-06 DIAGNOSIS — Z88.2: ICD-10-CM

## 2018-08-06 DIAGNOSIS — M48.062: ICD-10-CM

## 2018-08-06 DIAGNOSIS — K58.0: ICD-10-CM

## 2018-08-06 DIAGNOSIS — Z88.1: ICD-10-CM

## 2018-08-06 DIAGNOSIS — M51.16: Primary | ICD-10-CM

## 2018-08-06 DIAGNOSIS — E11.9: ICD-10-CM

## 2018-08-06 DIAGNOSIS — J32.9: ICD-10-CM

## 2018-08-06 DIAGNOSIS — Z91.011: ICD-10-CM

## 2018-08-06 DIAGNOSIS — Z88.8: ICD-10-CM

## 2018-08-06 DIAGNOSIS — Z79.899: ICD-10-CM

## 2018-08-06 DIAGNOSIS — Z96.652: ICD-10-CM

## 2018-08-06 DIAGNOSIS — J45.909: ICD-10-CM

## 2018-11-01 ENCOUNTER — HOSPITAL ENCOUNTER (OUTPATIENT)
Dept: HOSPITAL 53 - M PAIN | Age: 63
End: 2018-11-01
Attending: NURSE PRACTITIONER
Payer: COMMERCIAL

## 2018-11-01 DIAGNOSIS — E11.9: ICD-10-CM

## 2018-11-01 DIAGNOSIS — Z79.899: ICD-10-CM

## 2018-11-01 DIAGNOSIS — M51.16: Primary | ICD-10-CM

## 2018-11-01 DIAGNOSIS — I10: ICD-10-CM

## 2018-11-01 DIAGNOSIS — Z91.040: ICD-10-CM

## 2018-11-01 DIAGNOSIS — J45.909: ICD-10-CM

## 2018-11-01 DIAGNOSIS — Z88.0: ICD-10-CM

## 2018-11-01 DIAGNOSIS — M19.90: ICD-10-CM

## 2018-11-01 DIAGNOSIS — Z88.1: ICD-10-CM

## 2018-11-01 DIAGNOSIS — Z96.652: ICD-10-CM

## 2018-11-01 DIAGNOSIS — Z91.09: ICD-10-CM

## 2018-11-01 DIAGNOSIS — Z88.8: ICD-10-CM

## 2018-11-01 DIAGNOSIS — Z88.2: ICD-10-CM

## 2018-11-01 DIAGNOSIS — M53.3: ICD-10-CM

## 2018-11-01 DIAGNOSIS — G89.29: ICD-10-CM

## 2019-01-08 ENCOUNTER — HOSPITAL ENCOUNTER (OUTPATIENT)
Dept: HOSPITAL 53 - M PAIN | Age: 64
End: 2019-01-08
Attending: ANESTHESIOLOGY
Payer: COMMERCIAL

## 2019-01-08 DIAGNOSIS — M19.90: ICD-10-CM

## 2019-01-08 DIAGNOSIS — Z88.8: ICD-10-CM

## 2019-01-08 DIAGNOSIS — Z91.040: ICD-10-CM

## 2019-01-08 DIAGNOSIS — J45.909: ICD-10-CM

## 2019-01-08 DIAGNOSIS — Z91.09: ICD-10-CM

## 2019-01-08 DIAGNOSIS — Z88.2: ICD-10-CM

## 2019-01-08 DIAGNOSIS — E11.9: ICD-10-CM

## 2019-01-08 DIAGNOSIS — M46.1: ICD-10-CM

## 2019-01-08 DIAGNOSIS — Z79.899: ICD-10-CM

## 2019-01-08 DIAGNOSIS — E66.01: ICD-10-CM

## 2019-01-08 DIAGNOSIS — Z96.652: ICD-10-CM

## 2019-01-08 DIAGNOSIS — Z88.0: ICD-10-CM

## 2019-01-08 DIAGNOSIS — G89.29: Primary | ICD-10-CM

## 2019-01-08 DIAGNOSIS — M53.88: ICD-10-CM

## 2019-01-08 DIAGNOSIS — Z88.1: ICD-10-CM

## 2019-01-08 DIAGNOSIS — Z91.011: ICD-10-CM

## 2019-01-08 NOTE — REP
SI joint views:

 

History:  Left SI joint injection for pain.

 

11 seconds of fluoroscopy time is reported.

 

Findings:  A sequence of three last image hold fluoroscopically obtained spot

radiographs of the left SI joint document needle positions and contrast

injections associated with SI joint injection procedure.

 

 

Electronically Signed by

James Kumar MD 01/08/2019 01:52 P

## 2019-01-23 NOTE — ECWPNPC
PATIENT NAME: JERRY MILLER

: 1955

GENDER: FEMALE

MRN: [id number]

VISIT DATE: 2019

DISCHARGE DATE: 19 1232

VISIT LOCKED DATE TIME: 

PHYSICIAN: PEDRO HERNÁNDEZ MD

RESOURCE: PEDRO HERNÁNDEZ MD

 

           

           

REASON FOR APPOINTMENT

           

          1. W/C LEFT SIJ

           

HISTORY OF PRESENT ILLNESS

           

      HISTORY OF PRESENT ILLNESS:

      PAIN

           

           

          THE PATIENT DESCRIBES THE PAIN...

           

      FALL RISK SCREENING:

      SCREENING

           

           

          :NO FALLS IN THE PAST YEAR

           

CURRENT MEDICATIONS

           

          TAKING ALLOPURINOL 300 MG TABLET 1 TABLET ORALLY ONCE A DAY,

          NOTES: 900

          TAKING SINGULAIR 10 MG TABLET 1 TABLET IN THE EVENING ORALLY ONCE

          A DAY, NOTES: 900

          TAKING BENTYL 20 MG TABLET 1 TABLET ORALLY EVERY 4 HOURS AS

          NEEDED, NOTES: NONE RECENT

          TAKING VENTOLIN  (90 BASE) MCG/ACT AEROSOL SOLUTION 2

          PUFFS INHALATION AS NEEDED, NOTES: NONE RECENT

          TAKING VITAMIN B COMPLEX CAPSULE AS DIRECTED ORALLY DAILY, NOTES:

          900

          TAKING HYDROCHLOROTHIAZIDE 25 25 MG TABLET AS DIRECTED ORAL

          DAILY, NOTES: 900

          TAKING LATANOPROST 0.005 % SOLUTION 1 DROP INTO BOTH EYES EVENING

          OPHTHALMIC ONCE A DAY, NOTES: 2100

          TAKING SYMBICORT 80-4.5 MCG/ACT AEROSOL 2 PUFFS INHALATION TWICE

          A DAY, NOTES: 900

          TAKING TYLENOL 500 MG TABLET 1 TABLET AS NEEDED ORALLY EVERY 6

          HRS, NOTES: NONE RECENT

          TAKING REFRESH CELLUVISC 1 % SOLUTION 1 DROP OU OPHTHALMIC 24

          TIME(S) A DAY, NOTES: 2100

          TAKING DIFLUCAN 200 MG TABLET 1 TABLET ORALLY AS DIRECTED, NOTES:

          NONE RECENT

          TAKING FIBER 58.6 % POWDER 2 CAPSULES WITH 8 OUNCES OF LIQUID

          ORALLY DAILY, NOTES: 900

          TAKING CRANBERRY 405 MG CAPSULE ORALLY DAILY, NOTES: 900

          TAKING FLONASE ALLERGY RELIEF 50 MCG/ACT SUSPENSION 1 SPRAY IN

          EACH NOSTRIL NASALLY ONCE A DAY AS NEEDED, NOTES: 900

          TAKING OXYBUTYNIN CHLORIDE 5 MG TABLET 1 TABLET ORALLY 2 TIMES A

          DAY, NOTES: 2100

          TAKING POTASSIUM CHLORIDE 10 MEQ CAPSULE EXTENDED RELEASE 1

          CAPSULE WITH FOOD ORALLY ONCE A DAY, NOTES: 2100

          TAKING PROBIOTIC - CAPSULE ORALLY DAILY, NOTES: 900

          TAKING OMEPRAZOLE 20 MG CAPSULE DELAYED RELEASE 1 CAP ORALLY ONCE

          A DAY, NOTES: 900

          TAKING CABERGOLINE 0.5 MG TABLET 1 TAB ORALLY WEEKLY, NOTES: 

          TAKING TRAMADOL HCL 50 MG TABLET 1 TABLET AS NEEDED ORALLY Q8H

          PRN MDD3, NOTES: 2100

          TAKING GABAPENTIN 300 MG CAPSULE 1 CAPSULE ORALLY SPECIAL FUNDS

          TWO TIMES A DAY FOR PAINMDD2, NOTES: 2100

          TAKING ZYRTEC ALLERGY 10 MG TABLET 1 TABLET ORALLY ONCE A DAY,

          NOTES: 900

          TAKING CYMBALTA 30 MG CAPSULE DELAYED RELEASE PARTICLES 1 CAPSULE

          ORALLY FOR PAIN TWICE A DAY MDD2, NOTES: 2100

          TAKING SKELAXIN 800 MG TABLET 1 TABLET ORALLY BID PRN FOR SPASMS

          AND PAIN MDD 2, NOTES: 400 MGS 2100

          TAKING ALEVE 220 MG TABLET 1 TABLET WITH FOOD OR MILK AS NEEDED

          ORALLY EVERY 12 HRS, NOTES: 2100

          NOT-TAKING BENTYL 10 MG CAPSULE 1 CAPSULE ORALLY TWO TIMES A DAY

          NOT-TAKING XIIDRA 5 % SOLUTION 1 DROP INTO AFFECTED EYE

          OPHTHALMIC TWICE A DAY

          NOT-TAKING PREMARIN 0.625 MG/GM CREAM VAGINAL THREE TIMES A WEEK,

          NOTES: CURRENTLY ON HOLD

          DISCONTINUED LAMISIL 250 MG TABLET 1 TABLET ORALLY ONCE A DAY

          MEDICATION LIST REVIEWED AND RECONCILED WITH THE PATIENT

           

PAST MEDICAL HISTORY

           

          DIABETES

          BLOOD PRESSURE

          ASTHMA

          PITUITARY ADENOMA

          ARTHRITIS

          RUPTURED DISC/BACK PAIN

          IBS WITH DIARRHEA

          OPTIC DAMAGE DUE TO HYPERTENSION

          CHRONIC SINUSITIS

           

ALLERGIES

           

          PENICILLIN (FOR ALLERGIES USE ONLY): RASH: ALLERGY

          SULFA (FOR ALLERGY USE ONLY): RASH: ALLERGY

          PHENERGAN: BLOOD CLOT: SIDE EFFECTS

          LATEX CONDOMS, RUBBER BANDS, GLOVES: RASH: ALLERGY

          ERYTHROMYCIN: RASH: ALLERGY

          NICLKEL: RASH: ALLERGY

          SULFITES: DYSPNEA: ALLERGY

          MILK: GI UPSET, WATERY EYES AND SINUS CONGERSTION: SIDE EFFECTS

           

SURGICAL HISTORY

           

          BTL 

          GB 

          ACL 

          RIGHT ROTATOR CUFF 2015

          LEFT TOTAL KNEE REPLACEMENT 2017

           

FAMILY HISTORY

           

          FATHER: , DIAGNOSED WITH CANCER

          MOTHER: , DIAGNOSED WITH DIABETES, OTHER

          1 SON(S) , 1 DAUGHTER(S) .

          DAD-SKIN CAMOM-COPD, PULMONARY FIBROSISSON AND DAUGHTER-ASTHMA

           -  SARCOMA.

           

SOCIAL HISTORY

           

          GENERAL:

           

          TOBACCO USE

          ARE YOU A:NONSMOKER

           

           

          ALCOHOL SCREENING

          DID YOU HAVE A DRINK CONTAINING ALCOHOL IN THE PAST YEAR?NO

          POINTS0

          INTERPRETATIONNEGATIVE

           

           

          RECREATIONAL DRUG USE

          DRUG USE?NO

           

           

          CAFFEINE

          CAFFEINE USE?YES

          HOW OFTEN AND HOW MUCH? 1-2 CUPS COFFEE/DAY

           

           

          [facility]

          [id number]

           

           

          LANGUAGE

          LANGUAGES SPOKEN:ENGLISH

           

           

          LEARNING BARRIERS / SPECIAL NEEDS

          CHANGE FROM LAST VISIT?NO

          BARRIERS TO LEARNING?NO

          HEARING IMPAIRED?NO

          VISION IMPAIRED?YES ENLARGED OPTIC NERVE RELATED TO GESTATIONAL

          HYPERTENSION.

          COGNITIVELY IMPAIRED?NO

          :CORRECTIVE LENSES

          READINESS TO LEARN?YES

          LEARNING PREFERENCES?NO

          LEARNING CAPABILITIES PRESENT?YES

          EMOTIONAL BARRIERS?NO

          SPECIAL DEVICES?NO

           NEEDED?NO

           

           

          DOMESTIC VIOLENCE

          DO YOU FEEL SAFE IN YOUR ENVIRONMENT?YES

           

           

          NEW PATIENT PAIN DIARY

          TODAY'S VISITNOTES

          FROM 0-10, WHAT LEVEL IS YOUR PAIN TODAY?7

           

           

          PAIN CLINIC PFS, CLERGY, PUBLIC HEALTH REFERRALS

          PFS REFERRAL NEEDED?NO

          CLERGY REFERRAL NEEDED?NO

          PUBLIC HEALTH REFERRAL NEEDED?NO

          WAS THE PROVIDER NOTIFIED OF ANY PERTINENT INFO?NO

          HAS THE PATIENT BEEN EDUCATED REGARDING HIS/HER PLAN OF CARE?YES

          HAS THE PATIENT BEEN EDUCATED REGARDING PAIN, THE RISK FOR PAIN,

          THE IMPORTANCE OF EFFECTIVE PAIN MANAGEMENT, AND THE PAIN

          ASSESSMENT PROCESS?YES

           

           

          ADVANCE DIRECTIVE

          ADVANCE DIRECTIVE DISCUSSED WITH PATIENT:YES HCP INFORMATION

          GIVEN TO PATIENT, DECLINED ASSISTANCE IN FILLING OUT. 19 1113

          JS

           

           

          18 1050 REVIEWED WITH PT. AD 18 1413 BV REVIEWED WITH

          CELESTINE WITH PATIENT 19 1113 JS.

           

HOSPITALIZATION/MAJOR DIAGNOSTIC PROCEDURE

           

          SURGERY RELATED

          ASTHMA

           

REVIEW OF SYSTEMS

           

      REVIEWED BY:

           

          PROVIDER:    _____ .

           

      CONSTITUTIONAL:

           

          ANY CHANGE IN YOUR MEDICAL CONDITION?    NO . CHILLS    NO .

          FEVER    NO .

           

      INFECTION:

           

          DO YOU HAVE NEW INFECTIONS?    NO . DO YOU HAVE HISTORY OF MRSA? 

            NO .

           

      MUSCULOSKELETAL:

           

          ANY NEW PATTERNS OF PAIN OR NUMBNESS?    NO .

           

      GASTROENTEROLOGY:

           

          ANY NEW CHANGE IN BOWEL CONTROL?    NO .

           

      GENITOURINARY:

           

          ANY NEW CHANGE IN BLADDER CONTROL?    NO . IS THERE A CHANCE YOU

          COULD BE PREGNANT?    NO .

           

      HEMATOLOGY/LYMPH:

           

          DO YOU TAKE ANY BLOOD THINNERS? (FOR EXAMPLE- COUMADIN, PLAVIX,

          AGGRENOX, PLATEL, PRADAXA, OR XARELTO)    NO . WHEN WAS YOUR LAST

          DOSE?    DATE: TIME:  .

           

      NEUROLOGY:

           

          HAVE YOU FALLEN IN THE PAST 6 MONTHS?    NO . ANY NEW EXTREMITY

          NUMBNESS OR WEAKNESS?    NO .

           

      CARDIOLOGY:

           

          DO YOU HAVE A PACEMAKER OR DEFIBRILLATOR?    NO .

           

      RESPIRATORY:

           

          HAVE YOU BEEN SICK IN THE PAST WEEK?    NO . FEVER    NO . FLU

          LIKE SYMPTOMS?    NO . COUGH    NO .

           

      INTEGUMENTARY:

           

          DO YOU HAVE ANY RASHES OR OPEN SORES?    NO .

           

      ALLERGIC/IMMUNO:

           

          ARE YOU ALLERGIC TO SHELLFISH OR IV DYE?    NO . ANY NEW

          ALLERGIES?    NO .

           

      PSYCHIATRIC:

           

          DO YOU HAVE THOUGHTS OF HURTING YOURSELF OR SOMEONE ELSE?    NO .

          ARE YOU ABUSED, NEGLECTED, OR IN AN UNSAFE ENVIRONMENT?    NO .

           

      ENDOCRINOLOGY:

           

          ARE YOU DIABETIC?    NO .

           

      OTHER:

           

          DO YOU NEED ANY PRESCRIPTIONS?    NO . IF YES, PLEASE LIST:   

          ____ . ANY NEW PROBLEMS WITH YOUR MEDICATIONS?    NO . WHEN DID

          YOU LAST EAT?    ____19 1800 . WHEN DID YOU LAST DRINK?   

          ____19 0900 . WHAT DID YOU LAST DRINK?    ____APPLE JUICE .

          NAME OF PERSON DRIVING YOU HOME?    ____KIM . DO YOU HAVE ANY

          OTHER QUESTIONS OR CONCERNS    PATIENT TAKES ALLOPURINOL, STATES

          NOT TOLD TO HOLD MEDICATION AND HAS NEVER HELD IT IN THE PAST FOR

          PREVIOUS PROCEDURES. INFORMED PATIENT OF INCREASED RISK OF

          INFECTION. PATIENT VERBALIZED UNDERSTANDING AND WOULD LIKE TO

          PROCEED AS PLANNED WITH PROCEDURE. NOTIFIED DR. HERNÁNDEZ AT 1126,

          DR. HERNÁNDEZ DISCUSSED WITH PATIENT ALSO, OK TO PROCEED WITH

          PLANNED PROCEDURE .

           

VITAL SIGNS

           

           LBS, HT 65", BMI 37.27 INDEX, /65 MM HG, HR 65 /MIN,

          RR 18 /MIN, TEMP 97.4 F, OXYGEN SAT % 97%, SAFE IN ENV? (Y/N)

          YES, NA INITIALS AW 1052, REVIEWED BY: PAO.

           

ASSESSMENTS

           

          SACROILIITIS, NOT ELSEWHERE CLASSIFIED - M46.1 (PRIMARY)

           

PROCEDURES

           

      PN SI

          PRE PROCEDURE DIAGNOSIS    SACROILIITIS, SACROILIAC JOINT

          DYSFUNCTION

          POST PROCEDURE DIAGNOSIS    SACROILIITIS, SACROILIAC JOINT

          DYSFUNCTION

          PROCEDURE    LEFT SACROILIAC JOINT BLOCK

          SURGEON    DR. PEDRO HERNÁNDEZ

          ASSISTANT    NONE

          ANESTHESIA    LOCAL

          PRE PROCEDURE NOTE    PATIENT WITH HISTORY OF CHRONIC LOW BACK

          PAIN. I EVALUATED THE PATIENT AND REVIEWED THE CHART. I WENT OVER

          THE RISKS, ALTERNATIVES, AND BENEFITS ASSOCIATED WITH THIS

          PROCEDURE. THE PATIENT WOULD LIKE TO PROCEED AND GAVE CONSENT TO

          PERFORM THE PROCEDURE. THE PATIENT DENIES UNEXPLAINABLE WEIGHT

          LOSS, FEVER, CHILLS, OR NEW CHANGES IN URINARY OR BOWEL CONTROL

          DESCRIPTION OF PROCEDURE    THE PATIENT WAS BROUGHT TO THE

          PROCEDURE ROOM AND PLACED IN THE PRONE POSITION. THE LUMBOSACRAL

          AREA WAS CLEANED WITH CHLORAPREP SOLUTION AND DRAPED ASEPTICALLY.

          THE PROCEDURE WAS DONE UNDER STERILE CONDITIONS. I CHECKED

          LATERALITY AND THE LEVEL WHERE THE PROCEDURE WAS GOING TO BE

          PERFORMED WITH THE PATIENT AND THE SUPPORTING STAFF AT THE MOMENT

          OF THE TIME OUT IN THE PROCEDURE ROOM. UNDER FLUOROSCOPIC

          GUIDANCE, TARGET POINT WAS SELECTED AT THE LOWER BORDER OF THE

          LEFT SACROILIAC JOINT. TARGET POINT WAS SELECTED AFTER MEDIAL

          ROTATION AND TILT OF THE MAGNIFIER OF THE C-ARM. LIDOCAINE WAS

          USED TO NUMB THE SKIN AND SUBCUTANEOUS TISSUE BELOW IT. A SPINAL

          NEEDLE, 22-GAUGE, WAS ADVANCED UNDER FLUOROSCOPIC GUIDANCE AND

          FOLLOWING PATIENT FEEDBACK UNTIL THE TARGET AREA WAS TOUCHED. THE

          POSITION OF THE NEEDLE WAS VERIFIED WITH AP AND LATERAL VIEWS.

          AFTER PROPER POSITION OF THE NEEDLE WAS ACHIEVED, ISOVUE M DYE

          30%, 0.25 ML, WAS INJECTED SHOWING SPREAD OF THE DYE. THEN, A

          SOLUTION OF 20 MG OF KENALOG WAS INJECTED IN LEFT JOINT WITH 3 ML

          OF BUPIVACAINE 0.125%. THERE WAS NO EVIDENCE OF BLOOD,

          PARESTHESIA OR CEREBROSPINAL FLUID DURING THE PROCEDURE. THE

          PATIENT WAS SENT TO THE RECOVERY ROOM. THE PATIENT WAS MOVING THE

          EXTREMITIES AND DOING WELL. THERE WAS NO COMPLICATION DURING THE

          PROCEDURE. FLUOROSCOPY TIME WAS 11 SECONDS

          POST PROCEDURE NOTE    THE PATIENT WILL BE SEEN IN A FOLLOW UP IN

          THE NEXT FEW WEEKS. INSTRUCTIONS WERE GIVEN, QUESTIONS WERE

          ANSWERED, AND THE PATIENT EXPRESSED UNDERSTANDING AND AGREED WITH

          THE PLAN. I, JERZY BLACK, DOCUMENTED THE ABOVE INFORMATION

          ACTING AS A SCRIBE FOR DR. HERNÁNDEZ. I HAVE REVIEWED THE ABOVE

          DOCUMENT, WRITTEN BY JERZY VILLEGAS AND I VERIFY THAT IT

          IS ACCURATE.

      PN WORKMANS' COMP OPINION

          IN YOUR OPINION, WAS THE INCIDENT THAT THE PATIENT DESCRIBED THE

          COMPETENT MEDICAL CAUSE OF THIS INJURY/ILLNESS?    YES

          ARE THE PATIENT'S COMPLAINTS CONSISTENT WITH HIS/HER HISTORY OF

          THE INJURY/ILLNESS?    YES

          IS THE PATIENT'S HISTORY OF THE INJURY/ILLNESS CONSISTENT WITH

          YOUR OBJECTIVE FINDING?    YES

          WHAT IS THE PERCENTAGE OF TEMPORARY IMPAIRMENT?    MODERATE TO

          MARKED = 66.7%

          IS THE PATIENT WORKING?    YES

          DOCTOR ON SITE:    PEDRO MILLER MD

           

DIAGNOSTIC IMAGING

           

          [facility] FLUORO GUIDANCE (PAIN)[id number]

           

PROCEDURE CODES

           

          6045F RADXPS IN END [id number] PXD

           

          62869 INJECT SACROILIAC JOINT, MODIFIERS: LT

           

DISPOSITION & COMMUNICATION

           

FOLLOW UP

           

          3 WEEKS

           

 

ELECTRONICALLY SIGNED BY PEDRO HERNÁNDEZ MD, MD ON

          2019 AT 02:52 PM EST

           

           

           

 

DISCLAIMER :

THIS IS A VISIT SUMMARY EXTRACTED FROM THE [employer] CHART.

IT IS NOT A COPY OF THE [employer] PROGRESS NOTE.

AGUSTINA

## 2019-04-24 ENCOUNTER — HOSPITAL ENCOUNTER (OUTPATIENT)
Dept: HOSPITAL 53 - M PAIN | Age: 64
End: 2019-04-24
Attending: NURSE PRACTITIONER
Payer: COMMERCIAL

## 2019-04-24 DIAGNOSIS — E11.9: ICD-10-CM

## 2019-04-24 DIAGNOSIS — Z88.1: ICD-10-CM

## 2019-04-24 DIAGNOSIS — Z88.0: ICD-10-CM

## 2019-04-24 DIAGNOSIS — Z91.09: ICD-10-CM

## 2019-04-24 DIAGNOSIS — Z79.899: ICD-10-CM

## 2019-04-24 DIAGNOSIS — Z88.2: ICD-10-CM

## 2019-04-24 DIAGNOSIS — M19.90: ICD-10-CM

## 2019-04-24 DIAGNOSIS — Z91.011: ICD-10-CM

## 2019-04-24 DIAGNOSIS — Z91.040: ICD-10-CM

## 2019-04-24 DIAGNOSIS — M53.3: Primary | ICD-10-CM

## 2019-04-24 DIAGNOSIS — Z96.652: ICD-10-CM

## 2019-04-24 DIAGNOSIS — J45.909: ICD-10-CM

## 2019-04-24 DIAGNOSIS — I10: ICD-10-CM

## 2019-05-06 NOTE — ECWPNPC
PATIENT NAME: JERRY MILLER

: 1955

GENDER: FEMALE

MRN: [id number]

VISIT DATE: 2019

DISCHARGE DATE: 19 1345

VISIT LOCKED DATE TIME: 

PHYSICIAN: MIKE LIZAMA

RESOURCE: MIKE LIZAMA

 

           

           

REASON FOR APPOINTMENT

           

          1. W/C LOW BACK

           

HISTORY OF PRESENT ILLNESS

           

      HISTORY OF PRESENT ILLNESS:  HERE FOR POST

          PROCEDURE F/U.HAD LEFT SIJ ON 19.REPORTING >80 % IMPROVEMENT

          IN PAIN X 2 MONTHS THEN PAIN GRADUALLY RETURNED TO BASELINE THAT

          CONTINUES TODAY.CURRENTLY USING GABAPENTIN 300MG BID,METAXALONE

          800MG BID AND CYMBALTA 30MG BID FOR CHRONIC PAIN.THIS IS A WORK

          RELATED INJURY WITH DOI:[id number].RATING LOW BACK PAIN 4/10

          VAS.PAIN IS LOCATED OVER BILATERAL SIJ REGION L>R.

      PAIN

           

           

          THE PATIENT DESCRIBES THE PAIN...

          THE PATIENT DESCRIBES THE PAIN...

           

      FALL RISK SCREENING:

      SCREENING

           

           

          :NO FALLS REPORTED IN THE LAST YEAR

           

CURRENT MEDICATIONS

           

          TAKING ALLOPURINOL 300 MG TABLET 1 TABLET ORALLY ONCE A DAY

          TAKING SINGULAIR 10 MG TABLET 1 TABLET IN THE EVENING ORALLY ONCE

          A DAY

          TAKING BENTYL 20 MG TABLET 1 TABLET ORALLY EVERY 4 HOURS AS

          NEEDED

          TAKING VENTOLIN  (90 BASE) MCG/ACT AEROSOL SOLUTION 2

          PUFFS INHALATION AS NEEDED

          TAKING VITAMIN B COMPLEX CAPSULE AS DIRECTED ORALLY DAILY

          TAKING HYDROCHLOROTHIAZIDE 25 25 MG TABLET AS DIRECTED ORAL DAILY

          TAKING LATANOPROST 0.005 % SOLUTION 1 DROP INTO BOTH EYES EVENING

          OPHTHALMIC ONCE A DAY

          TAKING SYMBICORT 80-4.5 MCG/ACT AEROSOL 2 PUFFS INHALATION TWICE

          A DAY

          TAKING TYLENOL 500 MG TABLET 1 TABLET AS NEEDED ORALLY EVERY 6

          HRS

          TAKING REFRESH CELLUVISC 1 % SOLUTION 1 DROP OU OPHTHALMIC 24

          TIME(S) A DAY

          TAKING DIFLUCAN 200 MG TABLET 1 TABLET ORALLY AS DIRECTED

          TAKING FIBER 58.6 % POWDER 2 CAPSULES WITH 8 OUNCES OF LIQUID

          ORALLY DAILY

          TAKING CRANBERRY 405 MG CAPSULE ORALLY DAILY

          TAKING FLONASE ALLERGY RELIEF 50 MCG/ACT SUSPENSION 1 SPRAY IN

          EACH NOSTRIL NASALLY ONCE A DAY AS NEEDED

          TAKING OXYBUTYNIN CHLORIDE 5 MG TABLET 1 TABLET ORALLY 2 TIMES A

          DAY

          TAKING POTASSIUM CHLORIDE 10 MEQ CAPSULE EXTENDED RELEASE 1

          CAPSULE WITH FOOD ORALLY ONCE A DAY

          TAKING PROBIOTIC - CAPSULE ORALLY DAILY

          TAKING OMEPRAZOLE 20 MG CAPSULE DELAYED RELEASE 1 CAP ORALLY ONCE

          A DAY

          TAKING CABERGOLINE 0.5 MG TABLET 1 TAB ORALLY WEEKLY

          TAKING TRAMADOL HCL 50 MG TABLET 1 TABLET AS NEEDED ORALLY Q8H

          PRN MDD3

          TAKING GABAPENTIN 300 MG CAPSULE 1 CAPSULE ORALLY SPECIAL FUNDS

          TWO TIMES A DAY FOR PAINMDD2

          TAKING ZYRTEC ALLERGY 10 MG TABLET 1 TABLET ORALLY ONCE A DAY

          TAKING ALEVE 220 MG TABLET 1 TABLET WITH FOOD OR MILK AS NEEDED

          ORALLY EVERY 12 HRS

          TAKING SKELAXIN 800 MG TABLET 1 TABLET ORALLY BID PRN FOR SPASMS

          AND PAIN MDD 2

          TAKING CYMBALTA 30 MG CAPSULE DELAYED RELEASE PARTICLES 1 CAPSULE

          ORALLY FOR PAIN TWICE A DAY MDD2

          NOT-TAKING BENTYL 10 MG CAPSULE 1 CAPSULE ORALLY TWO TIMES A DAY

          NOT-TAKING XIIDRA 5 % SOLUTION 1 DROP INTO AFFECTED EYE

          OPHTHALMIC TWICE A DAY

          NOT-TAKING PREMARIN 0.625 MG/GM CREAM VAGINAL THREE TIMES A WEEK,

          NOTES: CURRENTLY ON HOLD

          MEDICATION LIST REVIEWED AND RECONCILED WITH THE PATIENT

           

PAST MEDICAL HISTORY

           

          DIABETES

          BLOOD PRESSURE

          ASTHMA

          PITUITARY ADENOMA

          ARTHRITIS

          RUPTURED DISC/BACK PAIN

          IBS WITH DIARRHEA

          OPTIC DAMAGE DUE TO HYPERTENSION

          CHRONIC SINUSITIS

           

ALLERGIES

           

          PENICILLIN (FOR ALLERGIES USE ONLY): RASH - ALLERGY

          SULFA (FOR ALLERGY USE ONLY): RASH - ALLERGY

          PHENERGAN: BLOOD CLOT - SIDE EFFECTS

          LATEX CONDOMS, RUBBER BANDS, GLOVES: RASH - ALLERGY

          ERYTHROMYCIN: RASH - ALLERGY

          NICLKEL: RASH - ALLERGY

          SULFITES: DYSPNEA - ALLERGY

          MILK: GI UPSET, WATERY EYES AND SINUS CONGERSTION - SIDE EFFECTS

           

SURGICAL HISTORY

           

          BTL 

          GB 

          ACL 

          RIGHT ROTATOR CUFF 2015

          LEFT TOTAL KNEE REPLACEMENT 2017

           

FAMILY HISTORY

           

          FATHER: , DIAGNOSED WITH CANCER

          MOTHER: , OTHER, DIABETES

          1 SON(S) , 1 DAUGHTER(S) .

          DAD-SKIN CA\NMOM-COPD, PULMONARY FIBROSIS\NSON AND

          DAUGHTER-ASTHMA \NHUSBAND -  [place]COM[place].

           

SOCIAL HISTORY

           

          GENERAL:

           

          TOBACCO USE  ARE YOU A: NONSMOKER .

           

           

          LATEX QUESTIONNAIRE

          LATEX ALLERGY : HAVE YOU EVER DEVELOPED ANY TYPE OF REACTION

          AFTER HANDLING LATEX PRODUCTS SUCH AS RUBBER GLOVES, CONDOMS,

          DIAPHRAGMS, BALLOONS, SOCKS, OR UNDERWEAR?NO

          LATEX ALLERGY : HAVE YOU EVER DEVELOPED ANY TYPE OF REACTION

          DURING OR AFTER DENTAL APPOINTMENT, VAGINAL/RECTAL EXAMINATION,

          SURGICAL PROCEDURE, OR ANY OTHER EXPOSURE?NO

          LATEX RISK : HAVE YOU EVER HAD ANY DIFFICULTY BREATHING OR HIVES

          AFTER EATING OR HANDLING ANY FRUITS, OR VEGETABLES; SUCH AS KIWI,

          BANANAS, STONE FRUITS, OR CHESTNUTSNO

          LATEX RISK : DO YOU HAVE A PREVIOUS PERSONAL HISTORY OF MORE THAN

          NINE SURGERIES, SPINA BIFIDA, OR REPEATED CATHERTIZATIONS? NO

          LATEX RISK : ARE YOU FREQUENTLY EXPOSED TO LATEX PRODUCTS IN YOUR

          OCCUPATION?NO

          DATE ASKED : 2019

           

           

          ALCOHOL SCREENING

          DID YOU HAVE A DRINK CONTAINING ALCOHOL IN THE PAST YEAR?NO

          POINTS0

          INTERPRETATIONNEGATIVE

           

           

          RECREATIONAL DRUG USE

          DRUG USE?NO

           

           

          CAFFEINE

          CAFFEINE USE?YES

          HOW OFTEN AND HOW MUCH? 1-2 CUPS COFFEE/DAY

           

           

          Catholic

          EERAEGIC20 Caodaism

           

           

          LANGUAGE

          LANGUAGES SPOKEN:ENGLISH

           

           

          LEARNING BARRIERS / SPECIAL NEEDS

          CHANGE FROM LAST VISIT?NO

          BARRIERS TO LEARNING?NO

          HEARING IMPAIRED?NO

          VISION IMPAIRED?YES ENLARGED OPTIC NERVE RELATED TO GESTATIONAL

          HYPERTENSION.

          :CORRECTIVE LENSES

          COGNITIVELY IMPAIRED?NO

          READINESS TO LEARN?YES

          LEARNING PREFERENCES?NO

          LEARNING CAPABILITIES PRESENT?YES

          EMOTIONAL BARRIERS?NO

          SPECIAL DEVICES?NO

           NEEDED?NO

           

           

          DOMESTIC VIOLENCE

          DO YOU FEEL SAFE IN YOUR ENVIRONMENT?YES

           

           

          NEW PATIENT PAIN DIARY

          TODAY'S VISITNOTES

          FROM 0-10, WHAT LEVEL IS YOUR PAIN TODAY?7

           

           

          PAIN CLINIC PFS, CLERGY, PUBLIC HEALTH REFERRALS

          PFS REFERRAL NEEDED?NO

          CLERGY REFERRAL NEEDED?NO

          PUBLIC HEALTH REFERRAL NEEDED?NO

          WAS THE PROVIDER NOTIFIED OF ANY PERTINENT INFO?YES

          HAS THE PATIENT BEEN EDUCATED REGARDING HIS/HER PLAN OF CARE?YES

          HAS THE PATIENT BEEN EDUCATED REGARDING PAIN, THE RISK FOR PAIN,

          THE IMPORTANCE OF EFFECTIVE PAIN MANAGEMENT, AND THE PAIN

          ASSESSMENT PROCESS?YES

           

           

          ADVANCE DIRECTIVE

          ADVANCE DIRECTIVE DISCUSSED WITH PATIENT:YES PT STATES THAT SHE

          HAS HCP PAPERWORK, DECLINES ASSISTANCE WITH FILLING OUT PAPERWORK

           

           

          18 1050 REVIEWED WITH PT. AD 18 1413 BV REVIEWED WITH

          CELESTINE WITH PATIENT 19 1113 JS.

           

HOSPITALIZATION/MAJOR DIAGNOSTIC PROCEDURE

           

          SURGERY RELATED

          ASTHMA

           

REVIEW OF SYSTEMS

           

      REVIEWED BY:

           

          PROVIDER:    MIKE WESLEY .

           

      CONSTITUTIONAL:

           

          ANY CHANGE IN YOUR MEDICAL CONDITION?    NO . CHILLS    NO .

          FEVER    NO .

           

      INFECTION:

           

          DO YOU HAVE NEW INFECTIONS?    NO . DO YOU HAVE HISTORY OF MRSA? 

            NO .

           

      MUSCULOSKELETAL:

           

          ANY NEW PATTERNS OF PAIN OR NUMBNESS?    NO .

           

      GASTROENTEROLOGY:

           

          ANY NEW CHANGE IN BOWEL CONTROL?    NO .

           

      GENITOURINARY:

           

          ANY NEW CHANGE IN BLADDER CONTROL?    NO . IS THERE A CHANCE YOU

          COULD BE PREGNANT?    NO .

           

      HEMATOLOGY/LYMPH:

           

          DO YOU TAKE ANY BLOOD THINNERS? (FOR EXAMPLE- COUMADIN, PLAVIX,

          AGGRENOX, PLATEL, PRADAXA, OR XARELTO)    NO . WHEN WAS YOUR LAST

          DOSE?    DATE: TIME:  .

           

      NEUROLOGY:

           

          HAVE YOU FALLEN IN THE PAST 12 MONTHS?    YES, PT STATES THAT SHE

          WAS AT HOME, OUTSIDE, CLEARING BRUSH, TRIPPED ON ROOT, BRUISING,

          NO INJURY, NO REPORT TO ED . ANY NEW EXTREMITY NUMBNESS OR

          WEAKNESS?    NO .

           

      CARDIOLOGY:

           

          DO YOU HAVE A PACEMAKER OR DEFIBRILLATOR?    NO .

           

      RESPIRATORY:

           

          HAVE YOU BEEN SICK IN THE PAST WEEK?    NO . FEVER    NO . FLU

          LIKE SYMPTOMS?    NO . COUGH    NO .

           

      INTEGUMENTARY:

           

          DO YOU HAVE ANY RASHES OR OPEN SORES?    NO .

           

      ALLERGIC/IMMUNO:

           

          ARE YOU ALLERGIC TO IV DYE?    NO . ANY NEW ALLERGIES?    NO .

           

      PSYCHIATRIC:

           

          DO YOU HAVE THOUGHTS OF HURTING YOURSELF OR SOMEONE ELSE?    NO .

          ARE YOU ABUSED, NEGLECTED, OR IN AN UNSAFE ENVIRONMENT?    NO .

           

      ENDOCRINOLOGY:

           

          ARE YOU DIABETIC?    NO .

           

      OTHER:

           

          DO YOU NEED ANY PRESCRIPTIONS?    NO . IF YES, PLEASE LIST:   

          ____ . ANY NEW PROBLEMS WITH YOUR MEDICATIONS?    NO . WHEN DID

          YOU LAST EAT?    ____ . WHEN DID YOU LAST DRINK?    ____ . WHAT

          DID YOU LAST DRINK?    ____ . NAME OF PERSON DRIVING YOU HOME?   

          ____ . DO YOU HAVE ANY OTHER QUESTIONS OR CONCERNS    YES, PT

          STATES THAT HER SPOUSE  OF NOVEMBER OF [place]COM[place], TUMOR IN LEG.

          PT TEARFUL. .

           

VITAL SIGNS

           

          .6 LBS, HT 65", BMI 35.54 INDEX, /63 MM HG, HR 68

          /MIN, RR 18 /MIN, TEMP 98.1 F, OXYGEN SAT % 98%, SAFE IN ENV?

          (Y/N) Y, REVIEWED BY: HILLARY.

           

EXAMINATION

           

      GENERAL EXAMINATION:

          LUNGS:LUNG SOUNDS ARE CLEAR.

           

          HEART:HEART RATE REGULAR.

           

          MUSCULOSKELETAL:*, MUSCLE STRENGTH TESTING 5/5 BILATERAL,

          PALPATION: POSITIVE FOR PAIN OVER L/S SPINE. POSITIVE FOR PAIN

          OVER L/S PARSPINALS.SPECIFIC POINT TENDERNESS OVER BILAT. SIJ

          L>R.POSITIVE FOR MANAV TEST BILAT. LOWER EXTREMITIES.

           

ASSESSMENTS

           

          SACROILIAC JOINT PAIN - M53.3 (PRIMARY)

           

TREATMENT

           

      SACROILIAC JOINT PAIN

          NOTES: BILAT SIJ W/C.

           

PROCEDURES

           

      PN WORKMANS' COMP OPINION

          IN YOUR OPINION, WAS THE INCIDENT THAT THE PATIENT DESCRIBED THE

          COMPETENT MEDICAL CAUSE OF THIS INJURY/ILLNESS?    YES

          ARE THE PATIENT'S COMPLAINTS CONSISTENT WITH HIS/HER HISTORY OF

          THE INJURY/ILLNESS?    YES

          IS THE PATIENT'S HISTORY OF THE INJURY/ILLNESS CONSISTENT WITH

          YOUR OBJECTIVE FINDING?    YES

          WHAT IS THE PERCENTAGE OF TEMPORARY IMPAIRMENT?    MODERATE TO

          MARKED = 66.7%

          IS THE PATIENT WORKING?    YES

          DOCTOR ON SITE:    PEDRO MILLER MD

           

PROCEDURE CODES

           

           ESTABILISHED PATIENT [facility] FACILITY CHARGE

           

DISPOSITION & COMMUNICATION

           

FOLLOW UP

           

          POST (REASON: BILAT SIJ W/C)

           

 

ELECTRONICALLY SIGNED BY LUPILLO JERONIMO ON

          2019 AT 12:02 PM EDT

           

           

           

 

DISCLAIMER :

THIS IS A VISIT SUMMARY EXTRACTED FROM THE [employer] CHART.

IT IS NOT A COPY OF THE [employer] PROGRESS NOTE.

AGUSTINA

## 2019-06-20 ENCOUNTER — HOSPITAL ENCOUNTER (OUTPATIENT)
Dept: HOSPITAL 53 - M PAIN | Age: 64
End: 2019-06-20
Attending: ANESTHESIOLOGY
Payer: COMMERCIAL

## 2019-06-20 DIAGNOSIS — Z91.040: ICD-10-CM

## 2019-06-20 DIAGNOSIS — D35.2: ICD-10-CM

## 2019-06-20 DIAGNOSIS — E11.9: ICD-10-CM

## 2019-06-20 DIAGNOSIS — Z79.891: ICD-10-CM

## 2019-06-20 DIAGNOSIS — Z79.899: ICD-10-CM

## 2019-06-20 DIAGNOSIS — Z91.048: ICD-10-CM

## 2019-06-20 DIAGNOSIS — Z88.8: ICD-10-CM

## 2019-06-20 DIAGNOSIS — I10: ICD-10-CM

## 2019-06-20 DIAGNOSIS — J45.909: ICD-10-CM

## 2019-06-20 DIAGNOSIS — Z88.0: ICD-10-CM

## 2019-06-20 DIAGNOSIS — Z96.652: ICD-10-CM

## 2019-06-20 DIAGNOSIS — Z88.2: ICD-10-CM

## 2019-06-20 DIAGNOSIS — K58.0: ICD-10-CM

## 2019-06-20 DIAGNOSIS — M46.1: Primary | ICD-10-CM

## 2019-06-20 DIAGNOSIS — Z88.1: ICD-10-CM

## 2019-06-20 NOTE — REP
SI joint series:  Six views bilateral:

 

History:  Bilateral SI joint injection for pain.

 

10 seconds of fluoroscopy time is reported.

 

Findings:  A sequence of six last image hold fluoroscopically obtained spot

radiographs of the SI joints document needle position and contrast injection

associated with SI joint injection procedure.

 

 

Electronically Signed by

James Kumar MD 06/20/2019 07:52 P

## 2019-07-01 NOTE — ECWPNPC
PATIENT NAME: JERRY MILLER

: 1955

GENDER: FEMALE

MRN: [id number]

VISIT DATE: 2019

DISCHARGE DATE: 19

VISIT LOCKED DATE TIME: 

PHYSICIAN: PEDRO HERNÁNDEZ MD

RESOURCE: PEDRO HERNÁNDEZ MD

 

           

           

REASON FOR APPOINTMENT

           

          1. NOLBERTORACHAEL CARLI W/C

           

HISTORY OF PRESENT ILLNESS

           

      HISTORY OF PRESENT ILLNESS:

      PAIN

           

           

          THE PATIENT DESCRIBES THE PAIN...

           

      FALL RISK SCREENING:

      SCREENING

           

           

          :NO FALLS REPORTED IN THE LAST YEAR

           

CURRENT MEDICATIONS

           

          TAKING ALLOPURINOL 300 MG TABLET 1 TABLET ORALLY ONCE A DAY,

          NOTES: 19 AM

          TAKING SINGULAIR 10 MG TABLET 1 TABLET IN THE EVENING ORALLY ONCE

          A DAY, NOTES: 19 AM

          TAKING BENTYL 20 MG TABLET 1 TABLET ORALLY EVERY 4 HOURS AS

          NEEDED, NOTES: NONE LATLEY

          TAKING VENTOLIN  (90 BASE) MCG/ACT AEROSOL SOLUTION 2

          PUFFS INHALATION AS NEEDED, NOTES: NONE LATELY

          TAKING VITAMIN B COMPLEX CAPSULE AS DIRECTED ORALLY DAILY, NOTES:

          19 AM

          TAKING HYDROCHLOROTHIAZIDE 25 25 MG TABLET AS DIRECTED ORAL

          DAILY, NOTES: 19 AM

          TAKING LATANOPROST 0.005 % SOLUTION 1 DROP INTO BOTH EYES EVENING

          OPHTHALMIC ONCE A DAY, NOTES: 19

          TAKING SYMBICORT 80-4.5 MCG/ACT AEROSOL 2 PUFFS INHALATION TWICE

          A DAY, NOTES: 19 AM

          TAKING REFRESH CELLUVISC 1 % SOLUTION 1 DROP OU OPHTHALMIC 24

          TIME(S) A DAY, NOTES: 19 AM

          TAKING FIBER 58.6 % POWDER 2 CAPSULES WITH 8 OUNCES OF LIQUID

          ORALLY DAILY, NOTES: 19 AM

          TAKING CRANBERRY 405 MG CAPSULE ORALLY DAILY, NOTES: 19 AM

          TAKING FLONASE ALLERGY RELIEF 50 MCG/ACT SUSPENSION 1 SPRAY IN

          EACH NOSTRIL NASALLY ONCE A DAY AS NEEDED, NOTES: 19

          TAKING OXYBUTYNIN CHLORIDE 5 MG TABLET 1 TABLET ORALLY 2 TIMES A

          DAY, NOTES: 19 AM

          TAKING POTASSIUM CHLORIDE 10 MEQ CAPSULE EXTENDED RELEASE 1

          CAPSULE WITH FOOD ORALLY ONCE A DAY, NOTES: 19 AM

          TAKING PROBIOTIC - CAPSULE ORALLY DAILY, NOTES: 19 AM

          TAKING OMEPRAZOLE 20 MG CAPSULE DELAYED RELEASE 1 CAP ORALLY ONCE

          A DAY, NOTES: NONE LATELY

          TAKING CABERGOLINE 0.5 MG TABLET 1 TAB ORALLY WEEKLY, NOTES:

          

          TAKING TRAMADOL HCL 50 MG TABLET 1 TABLET AS NEEDED ORALLY Q8H

          PRN MDD3, NOTES: 19

          TAKING GABAPENTIN 300 MG CAPSULE 1 CAPSULE ORALLY SPECIAL FUNDS

          TWO TIMES A DAY FOR PAINMDD2, NOTES: NONE LATELY

          TAKING ZYRTEC ALLERGY 10 MG TABLET 1 TABLET ORALLY ONCE A DAY,

          NOTES: 19 AM

          TAKING ALEVE 220 MG TABLET 1 TABLET WITH FOOD OR MILK AS NEEDED

          ORALLY EVERY 12 HRS, NOTES: 19

          TAKING SKELAXIN 800 MG TABLET 1 TABLET ORALLY BID PRN FOR SPASMS

          AND PAIN MDD 2, NOTES: 19 AM

          TAKING CYMBALTA 30 MG CAPSULE DELAYED RELEASE PARTICLES 1 CAPSULE

          ORALLY FOR PAIN TWICE A DAY MDD2, NOTES: 19 AM

          NOT-TAKING BENTYL 10 MG CAPSULE 1 CAPSULE ORALLY TWO TIMES A DAY

          NOT-TAKING XIIDRA 5 % SOLUTION 1 DROP INTO AFFECTED EYE

          OPHTHALMIC TWICE A DAY

          NOT-TAKING PREMARIN 0.625 MG/GM CREAM VAGINAL THREE TIMES A WEEK,

          NOTES: CURRENTLY ON HOLD

          DISCONTINUED TYLENOL 500 MG TABLET 1 TABLET AS NEEDED ORALLY

          EVERY 6 HRS

          DISCONTINUED DIFLUCAN 200 MG TABLET 1 TABLET ORALLY AS DIRECTED

          MEDICATION LIST REVIEWED AND RECONCILED WITH THE PATIENT

           

PAST MEDICAL HISTORY

           

          DIABETES

          BLOOD PRESSURE

          ASTHMA

          PITUITARY ADENOMA

          ARTHRITIS

          RUPTURED DISC/BACK PAIN

          IBS WITH DIARRHEA

          OPTIC DAMAGE DUE TO HYPERTENSION

          CHRONIC SINUSITIS

           

ALLERGIES

           

          PENICILLIN (FOR ALLERGIES USE ONLY): RASH - ALLERGY

          SULFA (FOR ALLERGY USE ONLY): RASH - ALLERGY

          PHENERGAN: BLOOD CLOT - SIDE EFFECTS

          LATEX CONDOMS, RUBBER BANDS, GLOVES: RASH - ALLERGY

          ERYTHROMYCIN: RASH - ALLERGY

          NICLKEL: RASH - ALLERGY

          SULFITES: DYSPNEA - ALLERGY

          MILK: GI UPSET, WATERY EYES AND SINUS CONGERSTION - SIDE EFFECTS

           

SURGICAL HISTORY

           

          BTL 

          GB 

          ACL 

          RIGHT ROTATOR CUFF 2015

          LEFT TOTAL KNEE REPLACEMENT 2017

           

FAMILY HISTORY

           

          FATHER: , DIAGNOSED WITH CANCER

          MOTHER: , DIABETES, OTHER

          1 SON(S) , 1 DAUGHTER(S) .

          DAD-SKIN CA\NMOM-COPD, PULMONARY FIBROSIS\NSON AND

          DAUGHTER-ASTHMA \NHUSBAND -  SARCOMA.

           

SOCIAL HISTORY

           

          GENERAL:

           

          TOBACCO USE  ARE YOU A: NONSMOKER.

           

           

          PAIN CLINIC PFS, CLERGY, PUBLIC HEALTH REFERRALS

          PFS REFERRAL NEEDED?NO

          CLERGY REFERRAL NEEDED?NO

          PUBLIC HEALTH REFERRAL NEEDED?NO

          WAS THE PROVIDER NOTIFIED OF ANY PERTINENT INFO?YES

          HAS THE PATIENT BEEN EDUCATED REGARDING HIS/HER PLAN OF CARE?YES

          HAS THE PATIENT BEEN EDUCATED REGARDING PAIN, THE RISK FOR PAIN,

          THE IMPORTANCE OF EFFECTIVE PAIN MANAGEMENT, AND THE PAIN

          ASSESSMENT PROCESS?YES

           

           

          LATEX QUESTIONNAIRE

          LATEX ALLERGY : HAVE YOU EVER DEVELOPED ANY TYPE OF REACTION

          AFTER HANDLING LATEX PRODUCTS SUCH AS RUBBER GLOVES, CONDOMS,

          DIAPHRAGMS, BALLOONS, SOCKS, OR UNDERWEAR?NO

          LATEX ALLERGY : HAVE YOU EVER DEVELOPED ANY TYPE OF REACTION

          DURING OR AFTER DENTAL APPOINTMENT, VAGINAL/RECTAL EXAMINATION,

          SURGICAL PROCEDURE, OR ANY OTHER EXPOSURE?NO

          LATEX RISK : HAVE YOU EVER HAD ANY DIFFICULTY BREATHING OR HIVES

          AFTER EATING OR HANDLING ANY FRUITS, OR VEGETABLES; SUCH AS KIWI,

          BANANAS, STONE FRUITS, OR CHESTNUTSNO

          LATEX RISK : DO YOU HAVE A PREVIOUS PERSONAL HISTORY OF MORE THAN

          NINE SURGERIES, SPINA BIFIDA, OR REPEATED CATHERTIZATIONS? NO

          LATEX RISK : ARE YOU FREQUENTLY EXPOSED TO LATEX PRODUCTS IN YOUR

          OCCUPATION?NO

          DATE ASKED : 2019

           

           

          CAFFEINE

          CAFFEINE USE?YES

          HOW OFTEN AND HOW MUCH? 1-2 CUPS COFFEE/DAY

           

           

          ADVANCE DIRECTIVE

          ADVANCE DIRECTIVE DISCUSSED WITH PATIENT:YES PT STATES THAT SHE

          HAS HCP PAPERWORK, DECLINES ASSISTANCE WITH FILLING OUT PAPERWORK

           

           

          Church

          [id number]

           

           

          LANGUAGE

          LANGUAGES SPOKEN:ENGLISH

           

           

          DOMESTIC VIOLENCE

          DO YOU FEEL SAFE IN YOUR ENVIRONMENT?YES

           

           

          NEW PATIENT PAIN DIARY

          TODAY'S VISITNOTES

          FROM 0-10, WHAT LEVEL IS YOUR PAIN TODAY?7

           

           

          ALCOHOL SCREENING

          DID YOU HAVE A DRINK CONTAINING ALCOHOL IN THE PAST YEAR?NO

          POINTS0

          INTERPRETATIONNEGATIVE

           

           

          RECREATIONAL DRUG USE

          DRUG USE?NO

           

           

          LEARNING BARRIERS / SPECIAL NEEDS

          CHANGE FROM LAST VISIT?NO

          BARRIERS TO LEARNING?NO

          HEARING IMPAIRED?NO

          VISION IMPAIRED?YES ENLARGED OPTIC NERVE RELATED TO GESTATIONAL

          HYPERTENSION.

          :CORRECTIVE LENSES

          COGNITIVELY IMPAIRED?NO

          READINESS TO LEARN?YES

          LEARNING PREFERENCES?NO

          LEARNING CAPABILITIES PRESENT?YES

          EMOTIONAL BARRIERS?NO

          SPECIAL DEVICES?NO

           NEEDED?NO

           

           

          18 1050 REVIEWED WITH PT. AD 18 1413 BV REVIEWED WITH

          WALLYWED WITH PATIENT 19 1113 JS.

           

HOSPITALIZATION/MAJOR DIAGNOSTIC PROCEDURE

           

          SURGERY RELATED

          ASTHMA

           

REVIEW OF SYSTEMS

           

      REVIEWED BY:

           

          PROVIDER:    _____ .

           

      CONSTITUTIONAL:

           

          ANY CHANGE IN YOUR MEDICAL CONDITION?    NO . CHILLS    NO .

          FEVER    NO .

           

      INFECTION:

           

          DO YOU HAVE NEW INFECTIONS?    NO . DO YOU HAVE HISTORY OF MRSA? 

            NO .

           

      MUSCULOSKELETAL:

           

          ANY NEW PATTERNS OF PAIN OR NUMBNESS?    NO .

           

      GASTROENTEROLOGY:

           

          ANY NEW CHANGE IN BOWEL CONTROL?    NO .

           

      GENITOURINARY:

           

          ANY NEW CHANGE IN BLADDER CONTROL?    NO . IS THERE A CHANCE YOU

          COULD BE PREGNANT?    NO .

           

      HEMATOLOGY/LYMPH:

           

          DO YOU TAKE ANY BLOOD THINNERS? (FOR EXAMPLE- COUMADIN, PLAVIX,

          AGGRENOX, PLATEL, PRADAXA, OR XARELTO)    NO . WHEN WAS YOUR LAST

          DOSE?    DATE: TIME:  .

           

      NEUROLOGY:

           

          HAVE YOU FALLEN IN THE PAST 12 MONTHS?    YES, PRIOR TO LAST

          VISIT . ANY NEW EXTREMITY NUMBNESS OR WEAKNESS?    NO .

           

      CARDIOLOGY:

           

          DO YOU HAVE A PACEMAKER OR DEFIBRILLATOR?    NO .

           

      RESPIRATORY:

           

          HAVE YOU BEEN SICK IN THE PAST WEEK?    NO . FEVER    NO . FLU

          LIKE SYMPTOMS?    NO . COUGH    NO .

           

      INTEGUMENTARY:

           

          DO YOU HAVE ANY RASHES OR OPEN SORES?    YES, COLD SORE ON LIP

          HEALING .

           

      ALLERGIC/IMMUNO:

           

          ARE YOU ALLERGIC TO IV DYE?    NO . ANY NEW ALLERGIES?    NO .

           

      PSYCHIATRIC:

           

          DO YOU HAVE THOUGHTS OF HURTING YOURSELF OR SOMEONE ELSE?    NO .

          ARE YOU ABUSED, NEGLECTED, OR IN AN UNSAFE ENVIRONMENT?    NO .

           

      ENDOCRINOLOGY:

           

          ARE YOU DIABETIC?    NO .

           

      OTHER:

           

          DO YOU NEED ANY PRESCRIPTIONS?    NO . IF YES, PLEASE LIST:   

          ____ . ANY NEW PROBLEMS WITH YOUR MEDICATIONS?    NO . WHEN DID

          YOU LAST EAT?    19 . WHEN DID YOU LAST DRINK?   

          19 . WHAT DID YOU LAST DRINK?    BLACK COFFEE . NAME OF

          PERSON DRIVING YOU HOME?    ETHLE . DO YOU HAVE ANY OTHER

          QUESTIONS OR CONCERNS    NO .

           

VITAL SIGNS

           

          .6 LBS, HT 65", BMI 33.88 INDEX, /55 MM HG, HR 69

          /MIN, RR 18 /MIN, TEMP 96.0 F, OXYGEN SAT % 99%, NA INITIALS AW

          1259, REVIEWED BY: EM.

           

ASSESSMENTS

           

          SACROILIITIS, NOT ELSEWHERE CLASSIFIED - M46.1 (PRIMARY)

           

PROCEDURES

           

      PN WORKMANS' COMP OPINION

          IN YOUR OPINION, WAS THE INCIDENT THAT THE PATIENT DESCRIBED THE

          COMPETENT MEDICAL CAUSE OF THIS INJURY/ILLNESS?    YES

          ARE THE PATIENT'S COMPLAINTS CONSISTENT WITH HIS/HER HISTORY OF

          THE INJURY/ILLNESS?    YES

          IS THE PATIENT'S HISTORY OF THE INJURY/ILLNESS CONSISTENT WITH

          YOUR OBJECTIVE FINDING?    YES

          WHAT IS THE PERCENTAGE OF TEMPORARY IMPAIRMENT?    MODERATE TO

          MARKED = 66.7%

          IS THE PATIENT WORKING?    YES

          DOCTOR ON SITE:    PEDRO MILLER MD

      PN SI

          PRE PROCEDURE DIAGNOSIS    SACROILIITIS, SACROILIAC JOINT

          DYSFUNCTION

          POST PROCEDURE DIAGNOSIS    SACROILIITIS, SACROILIAC JOINT

          DYSFUNCTION

          PROCEDURE    BILATERAL SACROILIAC JOINT BLOCK

          SURGEON    DR. PEDRO HERNÁNDEZ

          ASSISTANT    NONE

          ANESTHESIA    LOCAL

          PRE PROCEDURE NOTE    PATIENT WITH HISTORY OF CHRONIC LOW BACK

          PAIN. I EVALUATED THE PATIENT AND REVIEWED THE CHART. I WENT OVER

          THE RISKS, ALTERNATIVES, AND BENEFITS ASSOCIATED WITH THIS

          PROCEDURE. THE PATIENT WOULD LIKE TO PROCEED AND GAVE CONSENT TO

          PERFORM THE PROCEDURE. THE PATIENT DENIES UNEXPLAINABLE WEIGHT

          LOSS, FEVER, CHILLS, OR NEW CHANGES IN URINARY OR BOWEL CONTROL

          DESCRIPTION OF PROCEDURE    THE PATIENT WAS BROUGHT TO THE

          PROCEDURE ROOM AND PLACED IN THE PRONE POSITION. THE LUMBOSACRAL

          AREA WAS CLEANED WITH CHLORAPREP SOLUTION AND DRAPED ASEPTICALLY.

          THE PROCEDURE WAS DONE UNDER STERILE CONDITIONS. I CHECKED

          LATERALITY AND THE LEVEL WHERE THE PROCEDURE WAS GOING TO BE

          PERFORMED WITH THE PATIENT AND THE SUPPORTING STAFF AT THE MOMENT

          OF THE TIME OUT IN THE PROCEDURE ROOM. UNDER FLUOROSCOPIC

          GUIDANCE, TARGET POINT WAS SELECTED AT THE LOWER BORDER OF THE

          RIGHT AND LEFT SACROILIAC JOINT. TARGET POINT WAS SELECTED AFTER

          MEDIAL ROTATION AND TILT OF THE MAGNIFIER OF THE C-ARM. LIDOCAINE

          WAS USED TO NUMB THE SKIN AND SUBCUTANEOUS TISSUE BELOW IT. A

          SPINAL NEEDLE, 22-GAUGE, WAS ADVANCED UNDER FLUOROSCOPIC GUIDANCE

          AND FOLLOWING PATIENT FEEDBACK UNTIL THE TARGET AREA WAS TOUCHED.

          THE POSITION OF THE NEEDLE WAS VERIFIED WITH AP AND LATERAL

          VIEWS. AFTER PROPER POSITION OF THE NEEDLE WAS ACHIEVED, ISOVUE M

          DYE 30%, 0.25 ML, WAS INJECTED SHOWING SPREAD OF THE DYE. THEN, A

          SOLUTION OF 20 MG OF KENALOG WAS INJECTED IN RIGHT AND LEFT JOINT

          WITH 3 ML OF BUPIVACAINE 0.125%. THERE WAS NO EVIDENCE OF BLOOD,

          PARESTHESIA OR CEREBROSPINAL FLUID DURING THE PROCEDURE. THE

          PATIENT WAS SENT TO THE RECOVERY ROOM. THE PATIENT WAS MOVING THE

          EXTREMITIES AND DOING WELL. THERE WAS NO COMPLICATION DURING THE

          PROCEDURE. FLUOROSCOPY TIME WAS 10 SECONDS

          POST PROCEDURE NOTE    THE PATIENT WILL BE SEEN IN A FOLLOW UP IN

          THE NEXT FEW WEEKS. INSTRUCTIONS WERE GIVEN, QUESTIONS WERE

          ANSWERED, AND THE PATIENT EXPRESSED UNDERSTANDING AND AGREED WITH

          THE PLAN. I, IKE SANDERS, DOCUMENTED THE ABOVE INFORMATION

          ACTING AS A SCRIBE FOR DR. HERNÁNDEZ. I HAVE REVIEWED THE ABOVE

          DOCUMENT, WRITTEN BY IEK VILLEGAS AND I VERIFY THAT IT

          IS ACCURATE.

           

DIAGNOSTIC IMAGING

           

          [facility] FLUORO GUIDANCE (PAIN)[id number]

           

PROCEDURE CODES

           

          37770 INJECT SACROILIAC JOINT, MODIFIERS: 50

           

          6045F RADXPS IN END [id number] PXD

           

DISPOSITION & COMMUNICATION

           

FOLLOW UP

           

          3 WEEKS

           

 

ELECTRONICALLY SIGNED BY PEDRO HERNÁNDEZ MD, MD ON

          2019 AT 07:52 PM EDT

           

           

           

 

DISCLAIMER :

THIS IS A VISIT SUMMARY EXTRACTED FROM THE [employer] CHART.

IT IS NOT A COPY OF THE [employer] PROGRESS NOTE.

MTDD

## 2019-08-06 ENCOUNTER — HOSPITAL ENCOUNTER (OUTPATIENT)
Dept: HOSPITAL 53 - M PAIN | Age: 64
End: 2019-08-06
Attending: NURSE PRACTITIONER
Payer: COMMERCIAL

## 2019-08-06 DIAGNOSIS — Z88.1: ICD-10-CM

## 2019-08-06 DIAGNOSIS — J45.909: ICD-10-CM

## 2019-08-06 DIAGNOSIS — Z79.899: ICD-10-CM

## 2019-08-06 DIAGNOSIS — M46.1: Primary | ICD-10-CM

## 2019-08-06 DIAGNOSIS — I10: ICD-10-CM

## 2019-08-06 DIAGNOSIS — M19.90: ICD-10-CM

## 2019-08-06 DIAGNOSIS — Z91.040: ICD-10-CM

## 2019-08-06 DIAGNOSIS — Z88.8: ICD-10-CM

## 2019-08-06 DIAGNOSIS — Z96.652: ICD-10-CM

## 2019-08-06 DIAGNOSIS — Z88.0: ICD-10-CM

## 2019-08-06 DIAGNOSIS — Z88.2: ICD-10-CM

## 2019-08-06 DIAGNOSIS — Z91.011: ICD-10-CM

## 2019-08-06 DIAGNOSIS — E11.9: ICD-10-CM

## 2019-08-06 DIAGNOSIS — Z91.09: ICD-10-CM
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
## 2019-09-10 ENCOUNTER — HOSPITAL ENCOUNTER (OUTPATIENT)
Dept: HOSPITAL 53 - M LRY | Age: 64
End: 2019-09-10
Attending: NURSE PRACTITIONER
Payer: COMMERCIAL

## 2019-09-10 DIAGNOSIS — Z11.9: Primary | ICD-10-CM
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
## 2019-09-13 LAB
B BURGDOR IGG+IGM SER-ACNC: <0.91 ISR (ref 0–0.9)
B BURGDOR IGM SER IA-ACNC: <0.8 INDEX (ref 0–0.79)

## 2019-10-22 ENCOUNTER — HOSPITAL ENCOUNTER (OUTPATIENT)
Dept: HOSPITAL 53 - M PAIN | Age: 64
End: 2019-10-22
Attending: NURSE PRACTITIONER
Payer: COMMERCIAL

## 2019-10-22 DIAGNOSIS — I10: ICD-10-CM

## 2019-10-22 DIAGNOSIS — M46.1: Primary | ICD-10-CM

## 2019-10-22 DIAGNOSIS — E11.9: ICD-10-CM

## 2019-10-22 DIAGNOSIS — Z96.652: ICD-10-CM

## 2019-10-22 DIAGNOSIS — Z91.011: ICD-10-CM

## 2019-10-22 DIAGNOSIS — Z88.8: ICD-10-CM

## 2019-10-22 DIAGNOSIS — Z88.0: ICD-10-CM

## 2019-10-22 DIAGNOSIS — Z79.899: ICD-10-CM

## 2019-10-22 DIAGNOSIS — J45.909: ICD-10-CM

## 2019-10-22 DIAGNOSIS — Z91.09: ICD-10-CM

## 2019-10-22 DIAGNOSIS — Z88.2: ICD-10-CM

## 2019-10-22 DIAGNOSIS — G89.29: ICD-10-CM

## 2019-10-22 DIAGNOSIS — Z91.040: ICD-10-CM

## 2019-10-22 DIAGNOSIS — M19.90: ICD-10-CM

## 2019-10-22 DIAGNOSIS — M47.816: ICD-10-CM

## 2019-10-22 DIAGNOSIS — Z88.1: ICD-10-CM

## 2019-11-06 NOTE — ECWPNPC
PATIENT NAME: JERRY MILLER

: 1955

GENDER: FEMALE

MRN: [id number]

VISIT DATE: 10/22/2019

DISCHARGE DATE: 10/22/19 1541

VISIT LOCKED DATE TIME: 

PHYSICIAN: MIKE LIZAMA

RESOURCE: MIKE LIZAMA

 

           

           

REASON FOR APPOINTMENT

           

          1. W/C 2 MONTHS

           

HISTORY OF PRESENT ILLNESS

           

      HISTORY OF PRESENT ILLNESS:  HERE FOR F/U OF

          CHRONIC LOW BACK PAIN.CURRENTLY USING GABAPENTIN 300MG

          BID,METAXALONE 800MG BID AND CYMBALTA 30MG BID FOR CHRONIC

          PAIN.THIS IS A WORK RELATED INJURY WITH DOI:[id number].RATING LOW

          BACK PAIN 3/10 VAS.

      PAIN

           

           

          THE PATIENT DESCRIBES THE PAIN...

           

      FALL RISK SCREENING:

      SCREENING

           

           

          :NO FALLS REPORTED IN THE LAST YEAR

           

CURRENT MEDICATIONS

           

          TAKING ALLOPURINOL 300 MG TABLET 1 TABLET ORALLY ONCE A DAY

          TAKING SINGULAIR 10 MG TABLET 1 TABLET IN THE EVENING ORALLY ONCE

          A DAY

          TAKING VENTOLIN  (90 BASE) MCG/ACT AEROSOL SOLUTION 2

          PUFFS INHALATION AS NEEDED

          TAKING VITAMIN B COMPLEX CAPSULE AS DIRECTED ORALLY DAILY

          TAKING HYDROCHLOROTHIAZIDE 25 25 MG TABLET AS DIRECTED ORAL DAILY

          TAKING LATANOPROST 0.005 % SOLUTION 1 DROP INTO BOTH EYES EVENING

          OPHTHALMIC ONCE A DAY

          TAKING SYMBICORT 80-4.5 MCG/ACT AEROSOL 2 PUFFS INHALATION TWICE

          A DAY

          TAKING REFRESH CELLUVISC 1 % SOLUTION 1 DROP OU OPHTHALMIC 24

          TIME(S) A DAY

          TAKING FIBER 58.6 % POWDER 2 CAPSULES WITH 8 OUNCES OF LIQUID

          ORALLY DAILY

          TAKING CRANBERRY 405 MG CAPSULE ORALLY DAILY

          TAKING FLONASE ALLERGY RELIEF 50 MCG/ACT SUSPENSION 1 SPRAY IN

          EACH NOSTRIL NASALLY ONCE A DAY AS NEEDED

          TAKING OXYBUTYNIN CHLORIDE 5 MG TABLET 1 TABLET ORALLY 2 TIMES A

          DAY

          TAKING POTASSIUM CHLORIDE 10 MEQ CAPSULE EXTENDED RELEASE 1

          CAPSULE WITH FOOD ORALLY ONCE A DAY

          TAKING PROBIOTIC - CAPSULE ORALLY DAILY

          TAKING OMEPRAZOLE 20 MG CAPSULE DELAYED RELEASE 1 CAP ORALLY ONCE

          A DAY

          TAKING CABERGOLINE 0.5 MG TABLET 1 TAB ORALLY WEEKLY

          TAKING TRAMADOL HCL 50 MG TABLET 1 TABLET AS NEEDED ORALLY Q8H

          PRN MDD3

          TAKING ZYRTEC ALLERGY 10 MG TABLET 1 TABLET ORALLY ONCE A DAY

          TAKING ALEVE 220 MG TABLET 1 TABLET WITH FOOD OR MILK AS NEEDED

          ORALLY EVERY 12 HRS

          TAKING GABAPENTIN 300 MG CAPSULE 1 CAPSULE ORALLY SPECIAL FUNDS

          TWO TIMES A DAY FOR PAINMDD2

          TAKING SKELAXIN 800 MG TABLET 1 TABLET ORALLY BID PRN FOR SPASMS

          AND PAIN MDD 2

          TAKING CYMBALTA 30 MG CAPSULE DELAYED RELEASE PARTICLES 1 CAPSULE

          ORALLY FOR PAIN TWICE A DAY MDD2

          NOT-TAKING BENTYL 20 MG TABLET 1 TABLET ORALLY EVERY 4 HOURS AS

          NEEDED

          NOT-TAKING BENTYL 10 MG CAPSULE 1 CAPSULE ORALLY TWO TIMES A DAY

          NOT-TAKING XIIDRA 5 % SOLUTION 1 DROP INTO AFFECTED EYE

          OPHTHALMIC TWICE A DAY

          NOT-TAKING PREMARIN 0.625 MG/GM CREAM VAGINAL THREE TIMES A WEEK

          MEDICATION LIST REVIEWED AND RECONCILED WITH THE PATIENT

           

PAST MEDICAL HISTORY

           

          DIABETES

          BLOOD PRESSURE

          ASTHMA

          PITUITARY ADENOMA

          ARTHRITIS

          RUPTURED DISC/BACK PAIN

          IBS WITH DIARRHEA

          OPTIC DAMAGE DUE TO HYPERTENSION

          CHRONIC SINUSITIS

           

ALLERGIES

           

          PENICILLIN (FOR ALLERGIES USE ONLY): RASH - ALLERGY

          SULFA (FOR ALLERGY USE ONLY): RASH - ALLERGY

          PHENERGAN: BLOOD CLOT - SIDE EFFECTS

          LATEX CONDOMS, RUBBER BANDS, GLOVES: RASH - ALLERGY

          ERYTHROMYCIN: RASH - ALLERGY

          NICLKEL: RASH - ALLERGY

          SULFITES: DYSPNEA - ALLERGY

          MILK: GI UPSET, WATERY EYES AND SINUS CONGERSTION - SIDE EFFECTS

           

SURGICAL HISTORY

           

          BTL 

          GB 

          ACL 

          RIGHT ROTATOR CUFF 2015

          LEFT TOTAL KNEE REPLACEMENT 2017

           

FAMILY HISTORY

           

          FATHER: , DIAGNOSED WITH OTHER MALIGNANT NEOPLASM OF

          UNSPECIFIED SITE

          MOTHER: , DIABETES, OTHER SPECIFIED CONDITIONS

          INFLUENCING HEALTH STATUS

          1 SON(S) , 1 DAUGHTER(S) .

          DAD-SKIN CA\NMOM-COPD, PULMONARY FIBROSIS\NSON AND

          DAUGHTER-ASTHMA \NHUSBAND -  ANGELA PASSED AWAY

          IN NOVEMBER.

           

SOCIAL HISTORY

           

          GENERAL:

           

          TOBACCO USE  ARE YOU A: NONSMOKER.

           

           

          PAIN CLINIC PFS, CLERGY, PUBLIC HEALTH REFERRALS

          PFS REFERRAL NEEDED?NO

          CLERGY REFERRAL NEEDED?NO

          PUBLIC HEALTH REFERRAL NEEDED?NO

          WAS THE PROVIDER NOTIFIED OF ANY PERTINENT INFO?YES

          HAS THE PATIENT BEEN EDUCATED REGARDING HIS/HER PLAN OF CARE?YES

          HAS THE PATIENT BEEN EDUCATED REGARDING PAIN, THE RISK FOR PAIN,

          THE IMPORTANCE OF EFFECTIVE PAIN MANAGEMENT, AND THE PAIN

          ASSESSMENT PROCESS?YES

           

           

          LATEX QUESTIONNAIRE

          LATEX ALLERGY : HAVE YOU EVER DEVELOPED ANY TYPE OF REACTION

          AFTER HANDLING LATEX PRODUCTS SUCH AS RUBBER GLOVES, CONDOMS,

          DIAPHRAGMS, BALLOONS, SOCKS, OR UNDERWEAR?YES

          - PLEASE INDICATE :RUBBER GLOVES, CONDOMS

          LATEX ALLERGY : HAVE YOU EVER DEVELOPED ANY TYPE OF REACTION

          DURING OR AFTER DENTAL APPOINTMENT, VAGINAL/RECTAL EXAMINATION,

          SURGICAL PROCEDURE, OR ANY OTHER EXPOSURE?NO

          LATEX RISK : HAVE YOU EVER HAD ANY DIFFICULTY BREATHING OR HIVES

          AFTER EATING OR HANDLING ANY FRUITS, OR VEGETABLES; SUCH AS KIWI,

          BANANAS, STONE FRUITS, OR CHESTNUTSNO

          LATEX RISK : DO YOU HAVE A PREVIOUS PERSONAL HISTORY OF MORE THAN

          NINE SURGERIES, SPINA BIFIDA, OR REPEATED CATHERIZATIONS? NO

          LATEX RISK : ARE YOU FREQUENTLY EXPOSED TO LATEX PRODUCTS IN YOUR

          OCCUPATION?NO

          DATE ASKED : 2019 LATEX ALLERGY

           

           

          CAFFEINE

          CAFFEINE USE?YES

          HOW OFTEN AND HOW MUCH? 1-2 CUPS COFFEE/DAY

           

           

          ADVANCE DIRECTIVE

          ADVANCE DIRECTIVE DISCUSSED WITH PATIENT:YES STATES NO HCP AT

          THIS TIME, HAS [facility] AT HOME. DECLINED ASSISTANCE IN FILLING

          IT OUT.

           

           

          [employer]

          [id number]

           

           

          LANGUAGE

          LANGUAGES SPOKEN:ENGLISH

           

           

          DOMESTIC VIOLENCE

          DO YOU FEEL SAFE IN YOUR ENVIRONMENT?YES

           

           

          NEW PATIENT PAIN DIARY

          TODAY'S VISITNOTES

          FROM 0-10, WHAT LEVEL IS YOUR PAIN TODAY?7

           

           

          ALCOHOL SCREENING

          DID YOU HAVE A DRINK CONTAINING ALCOHOL IN THE PAST YEAR?NO

          POINTS0

          INTERPRETATIONNEGATIVE

           

           

          RECREATIONAL DRUG USE

          DRUG USE?NO

           

           

          LEARNING BARRIERS / SPECIAL NEEDS

          CHANGE FROM LAST VISIT?NO

          BARRIERS TO LEARNING?NO

          HEARING IMPAIRED?NO

          VISION IMPAIRED?YES ENLARGED OPTIC NERVE RELATED TO GESTATIONAL

          HYPERTENSION.

          COGNITIVELY IMPAIRED?NO

          :CORRECTIVE LENSES

          READINESS TO LEARN?YES

          LEARNING PREFERENCES?NO

          LEARNING CAPABILITIES PRESENT?YES

          EMOTIONAL BARRIERS?NO

          SPECIAL DEVICES?NO

           NEEDED?NO

           

           

          18 1050 REVIEWED WITH PT. AD 18 1413 BV REVIEWED WITH

          PTREVIEWED WITH PATIENT 19 1113 JSREVIEWED WITH PATEINT

          19 1352 NLJREVIEWED WITH PATIENT 10/22/19 1506 JS.

           

HOSPITALIZATION/MAJOR DIAGNOSTIC PROCEDURE

           

          SURGERY RELATED

          ASTHMA

           

REVIEW OF SYSTEMS

           

      REVIEWED BY:

           

          PROVIDER:    MIKE WESLEY .

           

      CONSTITUTIONAL:

           

          ANY CHANGE IN YOUR MEDICAL CONDITION?    NO . CHILLS    NO .

          FEVER    NO .

           

      INFECTION:

           

          DO YOU HAVE NEW INFECTIONS?    NO . DO YOU HAVE HISTORY OF MRSA? 

            NO .

           

      MUSCULOSKELETAL:

           

          ANY NEW PATTERNS OF PAIN OR NUMBNESS?    NO .

           

      GASTROENTEROLOGY:

           

          ANY NEW CHANGE IN BOWEL CONTROL?    NO .

           

      GENITOURINARY:

           

          ANY NEW CHANGE IN BLADDER CONTROL?    NO . IS THERE A CHANCE YOU

          COULD BE PREGNANT?    NO .

           

      HEMATOLOGY/LYMPH:

           

          DO YOU TAKE ANY BLOOD THINNERS? (FOR EXAMPLE- COUMADIN, PLAVIX,

          AGGRENOX, PLATEL, PRADAXA, OR XARELTO)    NO . WHEN WAS YOUR LAST

          DOSE?    DATE: TIME:  .

           

      NEUROLOGY:

           

          HAVE YOU FALLEN IN THE PAST 12 MONTHS?    NO . ANY NEW EXTREMITY

          NUMBNESS OR WEAKNESS?    NO .

           

      CARDIOLOGY:

           

          DO YOU HAVE A PACEMAKER OR DEFIBRILLATOR?    NO .

           

      RESPIRATORY:

           

          HAVE YOU BEEN SICK IN THE PAST WEEK?    NO . FEVER    NO . FLU

          LIKE SYMPTOMS?    NO . COUGH    NO .

           

      INTEGUMENTARY:

           

          DO YOU HAVE ANY RASHES OR OPEN SORES?    NO .

           

      ALLERGIC/IMMUNO:

           

          ARE YOU ALLERGIC TO IV DYE?    NO . ANY NEW ALLERGIES?    NO .

           

      PSYCHIATRIC:

           

          DO YOU HAVE THOUGHTS OF HURTING YOURSELF OR SOMEONE ELSE?    NO .

          ARE YOU ABUSED, NEGLECTED, OR IN AN UNSAFE ENVIRONMENT?    NO .

           

      ENDOCRINOLOGY:

           

          ARE YOU DIABETIC?    NO .

           

      OTHER:

           

          DO YOU NEED ANY PRESCRIPTIONS?    NO . IF YES, PLEASE LIST:   

          ____ . ANY NEW PROBLEMS WITH YOUR MEDICATIONS?    NO . WHEN DID

          YOU LAST EAT?    ____ . WHEN DID YOU LAST DRINK?    ____ . WHAT

          DID YOU LAST DRINK?    ____ . NAME OF PERSON DRIVING YOU HOME?   

          ____ . DO YOU HAVE ANY OTHER QUESTIONS OR CONCERNS    NO .

           

VITAL SIGNS

           

          .2 LBS, HT 65", BMI 33.48 INDEX, /62 MM HG, HR 55

          /MIN, RR 18 /MIN, TEMP 97.7 F, OXYGEN SAT % 100%, SAFE IN ENV?

          (Y/N) YES, NA INITIALS SC 14:52, REVIEWED BY: PAO.

           

EXAMINATION

           

      GENERAL EXAMINATION:

          GENERALAWAKE,ALERT ,PLEAASANT .

           

          PSYCHAFFECT NORMAL .

           

          LUNGS:LUNG EVANS ARE CLEAR TO AUSCULTATION BILATERALLY. GOOD

          MOVEMENT OF AIR .

           

          HEART:S1, S2 IN A REGULAR RATE AND RHYTHM. NO SIGNIFICANT

          MURMURS, RUBS OR GALLOPS NOTED .

           

ASSESSMENTS

           

          SACROILIITIS, NOT ELSEWHERE CLASSIFIED - M46.1 (PRIMARY)

           

          SPONDYLOSIS OF LUMBAR REGION WITHOUT MYELOPATHY OR RADICULOPATHY

          - M47.816

           

PROCEDURES

           

      PN WORKMANS' COMP OPINION

          IN YOUR OPINION, WAS THE INCIDENT THAT THE PATIENT DESCRIBED THE

          COMPETENT MEDICAL CAUSE OF THIS INJURY/ILLNESS?    YES

          ARE THE PATIENT'S COMPLAINTS CONSISTENT WITH HIS/HER HISTORY OF

          THE INJURY/ILLNESS?    YES

          IS THE PATIENT'S HISTORY OF THE INJURY/ILLNESS CONSISTENT WITH

          YOUR OBJECTIVE FINDING?    YES

          WHAT IS THE PERCENTAGE OF TEMPORARY IMPAIRMENT?    MODERATE TO

          MARKED = 66.7%

          IS THE PATIENT WORKING?    YES

          DOCTOR ON SITE:    PEDRO MILLER MD

           

PROCEDURE CODES

           

           ESTABILISHED PATIENT [facility] FACILITY CHARGE

           

DISPOSITION & COMMUNICATION

           

FOLLOW UP

           

          2 MONTHS (REASON: W/C LBP)

           

 

ELECTRONICALLY SIGNED BY LUPILLO JERONIMO ON

          2019 AT 02:35 PM EST

           

           

           

 

DISCLAIMER :

THIS IS A VISIT SUMMARY EXTRACTED FROM THE [employer] CHART.

IT IS NOT A COPY OF THE [employer] PROGRESS NOTE.

AGUSTINA

## 2019-11-19 ENCOUNTER — HOSPITAL ENCOUNTER (OUTPATIENT)
Dept: HOSPITAL 53 - M PAIN | Age: 64
End: 2019-11-19
Attending: NURSE PRACTITIONER
Payer: COMMERCIAL

## 2019-11-19 DIAGNOSIS — M46.1: Primary | ICD-10-CM

## 2019-11-23 NOTE — ECWPNPC
PATIENT NAME: JERRY MILLER

: 1955

GENDER: FEMALE

MRN: [id number]

VISIT DATE: 2019

DISCHARGE DATE: 19 1441

VISIT LOCKED DATE TIME: 

PHYSICIAN: MIKE LIZAMA

RESOURCE: MIKE LIZAMA

 

           

           

REASON FOR APPOINTMENT

           

          1. W/C LOW BACK

           

HISTORY OF PRESENT ILLNESS

           

      HISTORY OF PRESENT ILLNESS:  HERE FOR F/U OF

          CHRONIC LOW BACK PAIN.CURRENTLY USING GABAPENTIN 300MG

          BID,METAXALONE 800MG BID AND CYMBALTA 30MG BID FOR CHRONIC

          PAIN.THIS IS A WORK RELATED INJURY WITH DOI:[id number].RATING LOW

          BACK PAIN 8/10 VAS.PAIN HAS ESCALATED OVER THE PAST MONTH.FINDING

          IT DIFFICULT TO ESCALATE STAIRS AND SIT FOR PROLONGED PERIODS.

      PAIN

           

           

          THE PATIENT DESCRIBES THE PAIN...

           

      FALL RISK SCREENING:

      SCREENING

           

           

          :NO FALLS REPORTED IN THE LAST YEAR

           

CURRENT MEDICATIONS

           

          TAKING ALLOPURINOL 300 MG TABLET 1 TABLET ORALLY ONCE A DAY

          TAKING SINGULAIR 10 MG TABLET 1 TABLET IN THE EVENING ORALLY ONCE

          A DAY

          TAKING VENTOLIN  (90 BASE) MCG/ACT AEROSOL SOLUTION 2

          PUFFS INHALATION AS NEEDED

          TAKING VITAMIN B COMPLEX CAPSULE AS DIRECTED ORALLY DAILY

          TAKING HYDROCHLOROTHIAZIDE 25 25 MG TABLET AS DIRECTED ORAL DAILY

          TAKING LATANOPROST 0.005 % SOLUTION 1 DROP INTO BOTH EYES EVENING

          OPHTHALMIC ONCE A DAY

          TAKING SYMBICORT 80-4.5 MCG/ACT AEROSOL 2 PUFFS INHALATION TWICE

          A DAY

          TAKING REFRESH CELLUVISC 1 % SOLUTION 1 DROP OU OPHTHALMIC 24

          TIME(S) A DAY

          TAKING FIBER 58.6 % POWDER 2 CAPSULES WITH 8 OUNCES OF LIQUID

          ORALLY DAILY

          TAKING CRANBERRY 405 MG CAPSULE ORALLY DAILY

          TAKING FLONASE ALLERGY RELIEF 50 MCG/ACT SUSPENSION 1 SPRAY IN

          EACH NOSTRIL NASALLY ONCE A DAY AS NEEDED

          TAKING OXYBUTYNIN CHLORIDE 5 MG TABLET 1 TABLET ORALLY 2 TIMES A

          DAY

          TAKING POTASSIUM CHLORIDE 10 MEQ CAPSULE EXTENDED RELEASE 1

          CAPSULE WITH FOOD ORALLY ONCE A DAY

          TAKING PROBIOTIC - CAPSULE ORALLY DAILY

          TAKING OMEPRAZOLE 20 MG CAPSULE DELAYED RELEASE 1 CAP ORALLY ONCE

          A DAY

          TAKING CABERGOLINE 0.5 MG TABLET 1 TAB ORALLY WEEKLY

          TAKING TRAMADOL HCL 50 MG TABLET 1 TABLET AS NEEDED ORALLY Q8H

          PRN MDD3

          TAKING ZYRTEC ALLERGY 10 MG TABLET 1 TABLET ORALLY ONCE A DAY

          TAKING ALEVE 220 MG TABLET 1 TABLET WITH FOOD OR MILK AS NEEDED

          ORALLY EVERY 12 HRS

          TAKING GABAPENTIN 300 MG CAPSULE 1 CAPSULE ORALLY SPECIAL FUNDS

          TWO TIMES A DAY FOR PAINMDD2

          TAKING SKELAXIN 800 MG TABLET 1 TABLET ORALLY BID PRN FOR SPASMS

          AND PAIN MDD 2

          TAKING CYMBALTA 30 MG CAPSULE DELAYED RELEASE PARTICLES 1 CAPSULE

          ORALLY FOR PAIN TWICE A DAY MDD2

          TAKING CLINDAMYCIN  MG CAPSULE 1 CAPSULE ORALLY BID

          NOT-TAKING BENTYL 20 MG TABLET 1 TABLET ORALLY EVERY 4 HOURS AS

          NEEDED

          NOT-TAKING BENTYL 10 MG CAPSULE 1 CAPSULE ORALLY TWO TIMES A DAY

          NOT-TAKING XIIDRA 5 % SOLUTION 1 DROP INTO AFFECTED EYE

          OPHTHALMIC TWICE A DAY

          NOT-TAKING PREMARIN 0.625 MG/GM CREAM VAGINAL THREE TIMES A WEEK

          MEDICATION LIST REVIEWED AND RECONCILED WITH THE PATIENT

           

PAST MEDICAL HISTORY

           

          DIABETES

          BLOOD PRESSURE

          ASTHMA

          PITUITARY ADENOMA

          ARTHRITIS

          RUPTURED DISC/BACK PAIN

          IBS WITH DIARRHEA

          OPTIC DAMAGE DUE TO HYPERTENSION

          CHRONIC SINUSITIS

          TICK BITE X2 IN SEPTEMBER - TEST FOR LYME, AWAITING RESULTS

          19

           

ALLERGIES

           

          PENICILLIN (FOR ALLERGIES USE ONLY): RASH - ALLERGY

          SULFA (FOR ALLERGY USE ONLY): RASH - ALLERGY

          PHENERGAN: BLOOD CLOT - SIDE EFFECTS

          LATEX CONDOMS, RUBBER BANDS, GLOVES: RASH - ALLERGY

          ERYTHROMYCIN: RASH - ALLERGY

          NICLKEL: RASH - ALLERGY

          SULFITES: DYSPNEA - ALLERGY

          MILK: GI UPSET, WATERY EYES AND SINUS CONGERSTION - SIDE EFFECTS

          DOXYCYCLINE: HIVES - ALLERGY

           

SURGICAL HISTORY

           

          BTL 

          GB 

          ACL 

          RIGHT ROTATOR CUFF 2015

          LEFT TOTAL KNEE REPLACEMENT 2017

           

FAMILY HISTORY

           

          FATHER: , DIAGNOSED WITH OTHER MALIGNANT NEOPLASM OF

          UNSPECIFIED SITE

          MOTHER: , DIABETES, OTHER SPECIFIED CONDITIONS

          INFLUENCING HEALTH STATUS

          1 SON(S) , 1 DAUGHTER(S) .

          DAD-SKIN CA\NMOM-COPD, PULMONARY FIBROSIS\NSON AND

          DAUGHTER-ASTHMA \NHUSBAND -  ANGELA PASSED AWAY

          IN NOVEMBER.

           

SOCIAL HISTORY

           

          GENERAL:

           

          TOBACCO USE  ARE YOU A: NONSMOKER.

           

           

          PAIN CLINIC PFS, CLERGY, PUBLIC HEALTH REFERRALS

          PFS REFERRAL NEEDED?NO

          CLERGY REFERRAL NEEDED?NO

          PUBLIC HEALTH REFERRAL NEEDED?NO

          WAS THE PROVIDER NOTIFIED OF ANY PERTINENT INFO?YES

          HAS THE PATIENT BEEN EDUCATED REGARDING HIS/HER PLAN OF CARE?YES

          HAS THE PATIENT BEEN EDUCATED REGARDING PAIN, THE RISK FOR PAIN,

          THE IMPORTANCE OF EFFECTIVE PAIN MANAGEMENT, AND THE PAIN

          ASSESSMENT PROCESS?YES

           

           

          LATEX QUESTIONNAIRE

          LATEX ALLERGY : HAVE YOU EVER DEVELOPED ANY TYPE OF REACTION

          AFTER HANDLING LATEX PRODUCTS SUCH AS RUBBER GLOVES, CONDOMS,

          DIAPHRAGMS, BALLOONS, SOCKS, OR UNDERWEAR?YES

          - PLEASE INDICATE :RUBBER GLOVES, CONDOMS

          LATEX ALLERGY : HAVE YOU EVER DEVELOPED ANY TYPE OF REACTION

          DURING OR AFTER DENTAL APPOINTMENT, VAGINAL/RECTAL EXAMINATION,

          SURGICAL PROCEDURE, OR ANY OTHER EXPOSURE?NO

          LATEX RISK : HAVE YOU EVER HAD ANY DIFFICULTY BREATHING OR HIVES

          AFTER EATING OR HANDLING ANY FRUITS, OR VEGETABLES; SUCH AS KIWI,

          BANANAS, STONE FRUITS, OR CHESTNUTSNO

          LATEX RISK : DO YOU HAVE A PREVIOUS PERSONAL HISTORY OF MORE THAN

          NINE SURGERIES, SPINA BIFIDA, OR REPEATED CATHERIZATIONS? NO

          LATEX RISK : ARE YOU FREQUENTLY EXPOSED TO LATEX PRODUCTS IN YOUR

          OCCUPATION?NO

          DATE ASKED : 2019 LATEX ALLERGY

           

           

          CAFFEINE

          CAFFEINE USE?YES

          HOW OFTEN AND HOW MUCH? 1-2 CUPS COFFEE/DAY

           

           

          ADVANCE DIRECTIVE

          ADVANCE DIRECTIVE DISCUSSED WITH PATIENT:YES STATES NO HCP AT

          THIS TIME, HAS PAPER[facility]RK AT HOME. DECLINED ASSISTANCE IN FILLING

          IT OUT.

           

           

          Christianity

          [id number]

           

           

          LANGUAGE

          LANGUAGES SPOKEN:ENGLISH

           

           

          DOMESTIC VIOLENCE

          DO YOU FEEL SAFE IN YOUR ENVIRONMENT?YES

           

           

          NEW PATIENT PAIN DIARY

          TODAY'S VISITNOTES

          FROM 0-10, WHAT LEVEL IS YOUR PAIN TODAY?7

           

           

          ALCOHOL SCREENING

          DID YOU HAVE A DRINK CONTAINING ALCOHOL IN THE PAST YEAR?NO

          POINTS0

          INTERPRETATIONNEGATIVE

           

           

          RECREATIONAL DRUG USE

          DRUG USE?NO

           

           

          LEARNING BARRIERS / SPECIAL NEEDS

          CHANGE FROM LAST VISIT?NO

          BARRIERS TO LEARNING?NO

          HEARING IMPAIRED?NO

          VISION IMPAIRED?YES ENLARGED OPTIC NERVE RELATED TO GESTATIONAL

          HYPERTENSION.

          COGNITIVELY IMPAIRED?NO

          :CORRECTIVE LENSES

          READINESS TO LEARN?YES

          LEARNING PREFERENCES?NO

          LEARNING CAPABILITIES PRESENT?YES

          EMOTIONAL BARRIERS?NO

          SPECIAL DEVICES?NO

           NEEDED?NO

           

           

          18 1050 REVIEWED WITH PT. AD 18 1413 BV REVIEWED WITH

          PTREVIEWED WITH PATIENT 19 1113 JSREVIEWED WITH PATEINT

          19 1352 NLJREVIEWED WITH PATIENT 10/22/19 1506 JSREVIEWED

          WITH PATIENT 19 1357 JS.

           

HOSPITALIZATION/MAJOR DIAGNOSTIC PROCEDURE

           

          SURGERY RELATED

          ASTHMA

           

REVIEW OF SYSTEMS

           

      REVIEWED BY:

           

          PROVIDER:    MIKE WESLEY .

           

      CONSTITUTIONAL:

           

          ANY CHANGE IN YOUR MEDICAL CONDITION?    NO . CHILLS    NO .

          FEVER    NO .

           

      INFECTION:

           

          DO YOU HAVE NEW INFECTIONS?    NO . DO YOU HAVE HISTORY OF MRSA? 

            NO .

           

      MUSCULOSKELETAL:

           

          ANY NEW PATTERNS OF PAIN OR NUMBNESS?    YES, STATES PAIN

          WORSENING OVER THE PAST MONTH .

           

      GASTROENTEROLOGY:

           

          ANY NEW CHANGE IN BOWEL CONTROL?    NO .

           

      GENITOURINARY:

           

          ANY NEW CHANGE IN BLADDER CONTROL?    NO . IS THERE A CHANCE YOU

          COULD BE PREGNANT?    NO .

           

      HEMATOLOGY/LYMPH:

           

          DO YOU TAKE ANY BLOOD THINNERS? (FOR EXAMPLE- COUMADIN, PLAVIX,

          AGGRENOX, PLATEL, PRADAXA, OR XARELTO)    NO . WHEN WAS YOUR LAST

          DOSE?    DATE: TIME:  .

           

      NEUROLOGY:

           

          HAVE YOU FALLEN IN THE PAST 12 MONTHS?    NO . ANY NEW EXTREMITY

          NUMBNESS OR WEAKNESS?    NO .

           

      CARDIOLOGY:

           

          DO YOU HAVE A PACEMAKER OR DEFIBRILLATOR?    NO .

           

      RESPIRATORY:

           

          HAVE YOU BEEN SICK IN THE PAST WEEK?    YES, STATES SHE HAD A

          HEAD COLD FOR ABOUT 2 AND 1/2 DAYS . FEVER    NO . FLU LIKE

          SYMPTOMS?    NO . COUGH    NO .

           

      INTEGUMENTARY:

           

          DO YOU HAVE ANY RASHES OR OPEN SORES?    NO .

           

      ALLERGIC/IMMUNO:

           

          ARE YOU ALLERGIC TO IV DYE?    NO . ANY NEW ALLERGIES?    YES,

          DOXYCYCLINE .

           

      PSYCHIATRIC:

           

          DO YOU HAVE THOUGHTS OF HURTING YOURSELF OR SOMEONE ELSE?    NO .

          ARE YOU ABUSED, NEGLECTED, OR IN AN UNSAFE ENVIRONMENT?    NO .

           

      ENDOCRINOLOGY:

           

          ARE YOU DIABETIC?    NO .

           

      OTHER:

           

          DO YOU NEED ANY PRESCRIPTIONS?    NO . IF YES, PLEASE LIST:   

          ____ . ANY NEW PROBLEMS WITH YOUR MEDICATIONS?    NO . WHEN DID

          YOU LAST EAT?    ____ . WHEN DID YOU LAST DRINK?    ____ . WHAT

          DID YOU LAST DRINK?    ____ . NAME OF PERSON DRIVING YOU HOME?   

          ____ . DO YOU HAVE ANY OTHER QUESTIONS OR CONCERNS    NO .

           

VITAL SIGNS

           

          .6 LBS, HT 65", BMI 33.88 INDEX, /60 MM HG, HR 51

          /MIN, RR 18 /MIN, TEMP 97.0 F, OXYGEN SAT % 100%, SAFE IN ENV?

          (Y/N) YES, NA INITIALS SC 13:51, REVIEWED BY: PAO.

           

EXAMINATION

           

      GENERAL EXAMINATION:

          GENERAL ALERT,NO DISTRESS .

           

          PSYCH AFFECT NORMAL .

           

          LUNGS: LUNG SOUNDS ARE CLEAR .

           

          HEART: HEART RATE REGULAR .

           

          MUSCULOSKELETAL: MST 5/5 BILAT. LOWER EXTREMITIES .

           

          LUMBAR SACRAL SPINE TENDERNESS BILAT. SIJ .

           

          DIAGNOSTIC TESTS REVIEWEDMRI L/S SPINE-4-19-16 .

           

ASSESSMENTS

           

          SACROILIITIS, NOT ELSEWHERE CLASSIFIED - M46.1 (PRIMARY)

           

TREATMENT

           

      SACROILIITIS, NOT ELSEWHERE CLASSIFIED

          CONTINUE TRAMADOL HCL TABLET, 50 MG, 1 TABLET AS NEEDED, ORALLY,

          Q8H PRN MDD3

          CONTINUE ALEVE TABLET, 220 MG, 1 TABLET WITH FOOD OR MILK AS

          NEEDED, ORALLY, EVERY 12 HRS

          CONTINUE GABAPENTIN CAPSULE, 300 MG, 1 CAPSULE, ORALLY SPECIAL

          FUNDS, TWO TIMES A DAY FOR PAINMDD2

          CONTINUE SKELAXIN TABLET, 800 MG, 1 TABLET, ORALLY, BID PRN FOR

          SPASMS AND PAIN MDD 2

          CONTINUE CYMBALTA CAPSULE DELAYED RELEASE PARTICLES, 30 MG, 1

          CAPSULE, ORALLY FOR PAIN, TWICE A DAY MDD2

          NOTES:

           

          W/C BILAT SIJ

           

          .

           

PROCEDURES

           

      PN WORKMANS' COMP OPINION

          IN YOUR OPINION, WAS THE INCIDENT THAT THE PATIENT DESCRIBED THE

          COMPETENT MEDICAL CAUSE OF THIS INJURY/ILLNESS?    YES

          ARE THE PATIENT'S COMPLAINTS CONSISTENT WITH HIS/HER HISTORY OF

          THE INJURY/ILLNESS?    YES

          IS THE PATIENT'S HISTORY OF THE INJURY/ILLNESS CONSISTENT WITH

          YOUR OBJECTIVE FINDING?    YES

          WHAT IS THE PERCENTAGE OF TEMPORARY IMPAIRMENT?    MODERATE TO

          MARKED = 66.7%

          IS THE PATIENT WORKING?    YES

          DOCTOR ON SITE:    PEDRO MILLER MD

           

PREVENTIVE MEDICINE

           

      PAIN CLINIC TEACHING:

           

          PROCEDURE TEACHING   REVIEWED INFORMATION ON SACROILIAC JOINT

          INJECTION PROCEDURE WITH PATIENT. ALSO REVIEWED PRE-PROCEDURE

          INSTRUCTIONS. PATIENT VERBALIZED AN UNDERSTANDING. BENNY GUAN

          2019 2:52:18 PM > .

           

PROCEDURE CODES

           

           ESTABILISHED PATIENT [facility] FACILITY CHARGE

           

DISPOSITION & COMMUNICATION

           

FOLLOW UP

           

          POST (REASON: W/C BILAT SIJ)

           

 

ELECTRONICALLY SIGNED BY LUPILLO JERONIMO ON

          2019 AT 01:11 PM EST

           

           

           

 

DISCLAIMER :

THIS IS A VISIT SUMMARY EXTRACTED FROM THE 20x200 CHART.

IT IS NOT A COPY OF THE [employer] PROGRESS NOTE.

MTDD

## 2019-12-27 ENCOUNTER — HOSPITAL ENCOUNTER (EMERGENCY)
Dept: HOSPITAL 53 - M ED | Age: 64
Discharge: HOME | End: 2019-12-27
Payer: COMMERCIAL

## 2019-12-27 VITALS — BODY MASS INDEX: 33.39 KG/M2 | WEIGHT: 200.42 LBS | HEIGHT: 65 IN

## 2019-12-27 VITALS — SYSTOLIC BLOOD PRESSURE: 130 MMHG | DIASTOLIC BLOOD PRESSURE: 61 MMHG

## 2019-12-27 DIAGNOSIS — Z91.040: ICD-10-CM

## 2019-12-27 DIAGNOSIS — Z79.02: ICD-10-CM

## 2019-12-27 DIAGNOSIS — J45.909: ICD-10-CM

## 2019-12-27 DIAGNOSIS — Z79.899: ICD-10-CM

## 2019-12-27 DIAGNOSIS — T33.822A: ICD-10-CM

## 2019-12-27 DIAGNOSIS — Z88.0: ICD-10-CM

## 2019-12-27 DIAGNOSIS — Z88.8: ICD-10-CM

## 2019-12-27 DIAGNOSIS — Z88.2: ICD-10-CM

## 2019-12-27 DIAGNOSIS — Z86.718: ICD-10-CM

## 2019-12-27 DIAGNOSIS — N39.498: ICD-10-CM

## 2019-12-27 DIAGNOSIS — Z88.1: ICD-10-CM

## 2019-12-27 DIAGNOSIS — L03.012: Primary | ICD-10-CM

## 2019-12-27 PROCEDURE — 99282 EMERGENCY DEPT VISIT SF MDM: CPT

## 2019-12-27 PROCEDURE — 96372 THER/PROPH/DIAG INJ SC/IM: CPT

## 2020-01-15 ENCOUNTER — HOSPITAL ENCOUNTER (OUTPATIENT)
Dept: HOSPITAL 53 - M PAIN | Age: 65
End: 2020-01-15
Attending: ANESTHESIOLOGY
Payer: COMMERCIAL

## 2020-01-15 DIAGNOSIS — M46.1: Primary | ICD-10-CM

## 2020-01-15 NOTE — REP
SI joint series:  Bilateral seven views.

 

History:  Bilateral SI joint injection for pain.

 

13 seconds of fluoroscopy time is reported.

 

Findings:  A sequence of seven last image hold fluoroscopically obtained spot

radiographs of the SI joints document needle position associated with injection

procedure.

 

 

Electronically Signed by

James Kumar MD 01/15/2020 02:52 P

## 2020-01-28 NOTE — ECWPNPC
PATIENT NAME: JERRY MILLER

: 1955

GENDER: FEMALE

MRN: [id number]

VISIT DATE: 01/15/2020

DISCHARGE DATE: 01/15/20 1202

VISIT LOCKED DATE TIME: 

PHYSICIAN: PEDRO HERNÁNDEZ MD

RESOURCE: PEDRO HERNÁNDEZ MD

 

           

           

REASON FOR APPOINTMENT

           

          1. W/C BILAT SIJ

           

HISTORY OF PRESENT ILLNESS

           

      HISTORY OF PRESENT ILLNESS:

      PAIN

           

           

          THE PATIENT DESCRIBES THE PAIN...

           

      FALL RISK SCREENING:

      SCREENING

           

           

          :NO FALLS REPORTED IN THE LAST YEAR

           

CURRENT MEDICATIONS

           

          TAKING ALLOPURINOL 300 MG TABLET 1 TABLET ORALLY ONCE A DAY,

          NOTES: 20

          TAKING SINGULAIR 10 MG TABLET 1 TABLET IN THE EVENING ORALLY ONCE

          A DAY, NOTES: 20

          TAKING VENTOLIN  (90 BASE) MCG/ACT AEROSOL SOLUTION 2

          PUFFS INHALATION AS NEEDED, NOTES: NONE RECENTLY

          TAKING VITAMIN B COMPLEX CAPSULE AS DIRECTED ORALLY DAILY, NOTES:

          20

          TAKING HYDROCHLOROTHIAZIDE 25 25 MG TABLET 1 TABLET ORAL DAILY,

          NOTES: 20

          TAKING LATANOPROST 0.005 % SOLUTION 1 DROP INTO BOTH EYES EVENING

          OPHTHALMIC ONCE A DAY, NOTES: 20

          TAKING SYMBICORT 80-4.5 MCG/ACT AEROSOL 2 PUFFS INHALATION TWICE

          A DAY, NOTES: 1-15-20 0800

          TAKING REFRESH CELLUVISC 1 % SOLUTION 1 DROP OU OPHTHALMIC 24

          TIME(S) A DAY, NOTES: NONE RECENTLY

          TAKING CRANBERRY 405 MG CAPSULE ORALLY DAILY, NOTES: 20

          TAKING FLONASE ALLERGY RELIEF 50 MCG/ACT SUSPENSION 1 SPRAY IN

          EACH NOSTRIL NASALLY ONCE A DAY AS NEEDED, NOTES: 1-15-20 0800

          TAKING OXYBUTYNIN CHLORIDE 5 MG TABLET 1 TABLET ORALLY 2 TIMES A

          DAY, NOTES: 20

          TAKING POTASSIUM CHLORIDE 10 MEQ CAPSULE EXTENDED RELEASE 1

          CAPSULE WITH FOOD ORALLY ONCE A DAY, NOTES: 20

          TAKING PROBIOTIC - CAPSULE ORALLY DAILY, NOTES: 20

          TAKING OMEPRAZOLE 20 MG CAPSULE DELAYED RELEASE 1 CAP ORALLY ONCE

          A DAY, NOTES: 20

          TAKING CABERGOLINE 0.5 MG TABLET 1 TAB ORALLY WEEKLY, NOTES:

          20

          TAKING ZYRTEC ALLERGY 10 MG TABLET 1 TABLET ORALLY ONCE A DAY,

          NOTES: 1-14-20 0900

          TAKING ALEVE 220 MG TABLET 1 TABLET WITH FOOD OR MILK AS NEEDED

          ORALLY EVERY 12 HRS, NOTES: 3 DAYS AGO

          TAKING GABAPENTIN 300 MG CAPSULE 1 CAPSULE ORALLY SPECIAL FUNDS

          TWO TIMES A DAY FOR PAINMDD2, NOTES: 20

          TAKING SKELAXIN 800 MG TABLET 1 TABLET ORALLY BID PRN FOR SPASMS

          AND PAIN MDD 2, NOTES: 20

          TAKING CYMBALTA 30 MG CAPSULE DELAYED RELEASE PARTICLES 1 CAPSULE

          ORALLY FOR PAIN TWICE A DAY MDD2, NOTES: 20

          TAKING TRAMADOL HCL 50 MG TABLET 1 TABLET AS NEEDED ORALLY Q8H

          PRN MDD3, NOTES: 20

          NOT-TAKING FIBER 58.6 % POWDER 2 CAPSULES WITH 8 OUNCES OF LIQUID

          ORALLY DAILY

          NOT-TAKING CLINDAMYCIN  MG CAPSULE 1 CAPSULE ORALLY BID

          NOT-TAKING BENTYL 20 MG TABLET 1 TABLET ORALLY EVERY 4 HOURS AS

          NEEDED

          NOT-TAKING BENTYL 10 MG CAPSULE 1 CAPSULE ORALLY TWO TIMES A DAY

          NOT-TAKING XIIDRA 5 % SOLUTION 1 DROP INTO AFFECTED EYE

          OPHTHALMIC TWICE A DAY

          NOT-TAKING PREMARIN 0.625 MG/GM CREAM VAGINAL THREE TIMES A WEEK

          MEDICATION LIST REVIEWED AND RECONCILED WITH THE PATIENT

           

PAST MEDICAL HISTORY

           

          DIABETES

          BLOOD PRESSURE

          ASTHMA

          PITUITARY ADENOMA

          ARTHRITIS

          RUPTURED DISC/BACK PAIN

          IBS WITH DIARRHEA

          OPTIC DAMAGE DUE TO HYPERTENSION

          CHRONIC SINUSITIS

          TICK BITE X2 IN SEPTEMBER - TESTE FOR LYME, AWAITING RESULTS

          19

           

ALLERGIES

           

          PENICILLIN (FOR ALLERGIES USE ONLY): RASH - ALLERGY

          SULFA (FOR ALLERGY USE ONLY): RASH - ALLERGY

          PHENERGAN: BLOOD CLOT - SIDE EFFECTS

          LATEX CONDOMS, RUBBER BANDS, GLOVES: RASH - ALLERGY

          ERYTHROMYCIN: RASH - ALLERGY

          NICKEL: RASH - ALLERGY

          SULFITES: DYSPNEA - ALLERGY

          MILK: GI UPSET, WATERY EYES AND SINUS CONGERSTION - SIDE EFFECTS

          DOXYCYCLINE: HIVES - ALLERGY

           

SURGICAL HISTORY

           

          BTL 

          GB 

          ACL 

          RIGHT ROTATOR CUFF 2015

          LEFT TOTAL KNEE REPLACEMENT 2017

           

FAMILY HISTORY

           

          FATHER: , DIAGNOSED WITH OTHER MALIGNANT NEOPLASM OF

          UNSPECIFIED SITE

          MOTHER: , DIABETES, OTHER SPECIFIED CONDITIONS

          INFLUENCING HEALTH STATUS

          1 SON(S) , 1 DAUGHTER(S) .

          DAD-SKIN CA\NMOM-COPD, PULMONARY FIBROSIS\NSON AND

          DAUGHTER-ASTHMA \NHUSBAND -  ANGELA PASSED AWAY

          IN NOVEMBER.

           

SOCIAL HISTORY

           

          GENERAL:

           

          TOBACCO USE  ARE YOU A: NONSMOKER.

           

           

          PAIN CLINIC PFS, CLERGY, PUBLIC HEALTH REFERRALS

          PFS REFERRAL NEEDED?NO

          CLERGY REFERRAL NEEDED?NO

          PUBLIC HEALTH REFERRAL NEEDED?NO

          WAS THE PROVIDER NOTIFIED OF ANY PERTINENT INFO?YES

          HAS THE PATIENT BEEN EDUCATED REGARDING HIS/HER PLAN OF CARE?YES

          HAS THE PATIENT BEEN EDUCATED REGARDING PAIN, THE RISK FOR PAIN,

          THE IMPORTANCE OF EFFECTIVE PAIN MANAGEMENT, AND THE PAIN

          ASSESSMENT PROCESS?YES

           

           

          LATEX QUESTIONNAIRE

          LATEX ALLERGY : HAVE YOU EVER DEVELOPED ANY TYPE OF REACTION

          AFTER HANDLING LATEX PRODUCTS SUCH AS RUBBER GLOVES, CONDOMS,

          DIAPHRAGMS, BALLOONS, SOCKS, OR UNDERWEAR?YES

          - PLEASE INDICATE :RUBBER GLOVES, CONDOMS

          LATEX ALLERGY : HAVE YOU EVER DEVELOPED ANY TYPE OF REACTION

          DURING OR AFTER DENTAL APPOINTMENT, VAGINAL/RECTAL EXAMINATION,

          SURGICAL PROCEDURE, OR ANY OTHER EXPOSURE?NO

          LATEX RISK : HAVE YOU EVER HAD ANY DIFFICULTY BREATHING OR HIVES

          AFTER EATING OR HANDLING ANY FRUITS, OR VEGETABLES; SUCH AS KIWI,

          BANANAS, STONE FRUITS, OR CHESTNUTSNO

          LATEX RISK : DO YOU HAVE A PREVIOUS PERSONAL HISTORY OF MORE THAN

          NINE SURGERIES, SPINA BIFIDA, OR REPEATED CATHERIZATIONS? NO

          LATEX RISK : ARE YOU FREQUENTLY EXPOSED TO LATEX PRODUCTS IN YOUR

          OCCUPATION?NO

          DATE ASKED : 2019 LATEX ALLERGY

           

           

          CAFFEINE

          CAFFEINE USE?YES

          HOW OFTEN AND HOW MUCH? 1-2 CUPS COFFEE/DAY

           

           

          ADVANCE DIRECTIVE

          ADVANCE DIRECTIVE DISCUSSED WITH PATIENT:YES STATES NO HCP AT

          THIS TIME, HAS [facility] AT HOME. DECLINED ASSISTANCE IN FILLING

          IT OUT.

           

           

          [employer]

          [id number]

           

           

          LANGUAGE

          LANGUAGES SPOKEN:ENGLISH

           

           

          DOMESTIC VIOLENCE

          DO YOU FEEL SAFE IN YOUR ENVIRONMENT?YES

           

           

          NEW PATIENT PAIN DIARY

          TODAY'S VISITNOTES

          FROM 0-10, WHAT LEVEL IS YOUR PAIN TODAY?7

           

           

          ALCOHOL SCREENING

          DID YOU HAVE A DRINK CONTAINING ALCOHOL IN THE PAST YEAR?NO

          POINTS0

          INTERPRETATIONNEGATIVE

           

           

          RECREATIONAL DRUG USE

          DRUG USE?NO

           

           

          LEARNING BARRIERS / SPECIAL NEEDS

          CHANGE FROM LAST VISIT?NO

          BARRIERS TO LEARNING?NO

          HEARING IMPAIRED?NO

          VISION IMPAIRED?YES ENLARGED OPTIC NERVE RELATED TO GESTATIONAL

          HYPERTENSION.

          COGNITIVELY IMPAIRED?NO

          :CORRECTIVE LENSES

          READINESS TO LEARN?YES

          LEARNING PREFERENCES?NO

          LEARNING CAPABILITIES PRESENT?YES

          EMOTIONAL BARRIERS?NO

          SPECIAL DEVICES?NO

           NEEDED?NO

           

           

          18 1050 REVIEWED WITH PT. AD 18 1413 BV REVIEWED WITH

          PTREVIEWED WITH PATIENT 19 1113 JSREVIEWED WITH PATEINT

          19 1352 NLJREVIEWED WITH PATIENT 10/22/19 1506 JSREVIEWED

          WITH PATIENT 19 1016 JS.

           

HOSPITALIZATION/MAJOR DIAGNOSTIC PROCEDURE

           

          SURGERY RELATED

          ASTHMA

           

REVIEW OF SYSTEMS

           

      REVIEWED BY:

           

          PROVIDER:    _____ .

           

      CONSTITUTIONAL:

           

          ANY CHANGE IN YOUR MEDICAL CONDITION?    NO . CHILLS    NO .

          FEVER    NO .

           

      INFECTION:

           

          DO YOU HAVE NEW INFECTIONS?    NO . DO YOU HAVE HISTORY OF MRSA? 

            NO .

           

      MUSCULOSKELETAL:

           

          ANY NEW PATTERNS OF PAIN OR NUMBNESS?    NO .

           

      GASTROENTEROLOGY:

           

          ANY NEW CHANGE IN BOWEL CONTROL?    NO .

           

      GENITOURINARY:

           

          ANY NEW CHANGE IN BLADDER CONTROL?    NO . IS THERE A CHANCE YOU

          COULD BE PREGNANT?    NO .

           

      HEMATOLOGY/LYMPH:

           

          DO YOU TAKE ANY BLOOD THINNERS? (FOR EXAMPLE- COUMADIN, PLAVIX,

          AGGRENOX, PLATEL, PRADAXA, OR XARELTO)    NO . WHEN WAS YOUR LAST

          DOSE?    DATE: TIME:  .

           

      NEUROLOGY:

           

          HAVE YOU FALLEN IN THE PAST 12 MONTHS?    NO . ANY NEW EXTREMITY

          NUMBNESS OR WEAKNESS?    NO .

           

      CARDIOLOGY:

           

          DO YOU HAVE A PACEMAKER OR DEFIBRILLATOR?    NO .

           

      RESPIRATORY:

           

          HAVE YOU BEEN SICK IN THE PAST WEEK?    NO . FEVER    NO . FLU

          LIKE SYMPTOMS?    NO . COUGH    NO .

           

      INTEGUMENTARY:

           

          DO YOU HAVE ANY RASHES OR OPEN SORES?    NO .

           

      ALLERGIC/IMMUNO:

           

          ARE YOU ALLERGIC TO IV DYE?    NO . ANY NEW ALLERGIES?    YES -

          DOXYCYCLINE .

           

      PSYCHIATRIC:

           

          DO YOU HAVE THOUGHTS OF HURTING YOURSELF OR SOMEONE ELSE?    NO .

          ARE YOU ABUSED, NEGLECTED, OR IN AN UNSAFE ENVIRONMENT?    NO .

           

      ENDOCRINOLOGY:

           

          ARE YOU DIABETIC?    NO .

           

      OTHER:

           

          DO YOU NEED ANY PRESCRIPTIONS?    NO . IF YES, PLEASE LIST:   

          ____ . ANY NEW PROBLEMS WITH YOUR MEDICATIONS?    NO . WHEN DID

          YOU LAST EAT?    20 . WHEN DID YOU LAST DRINK?   

          1-15-20 12 MIDNIGHT . WHAT DID YOU LAST DRINK?    WATER . NAME OF

          PERSON DRIVING YOU HOME?    BERNARD . DO YOU HAVE ANY OTHER

          QUESTIONS OR CONCERNS    YES - MEDICATIONS NOT FILLED UNDER

          WORKERS' COMPENSATION .

           

VITAL SIGNS

           

          .8 LBS, HT 65", BMI 32.75 INDEX, /63 MM HG, HR 75

          /MIN, RR 18 /MIN, TEMP 98.7 F, OXYGEN SAT % 100%, NA INITIALS SC

          10:09, REVIEWED BY: LS.

           

ASSESSMENTS

           

          SACROILIITIS, NOT ELSEWHERE CLASSIFIED - M46.1 (PRIMARY)

           

TREATMENT

           

      SACROILIITIS, NOT ELSEWHERE CLASSIFIED

          [facility] FLUORO GUIDANCE (PAIN)

           

PROCEDURES

           

      PN WORKMANS' COMP OPINION

          IN YOUR OPINION, WAS THE INCIDENT THAT THE PATIENT DESCRIBED THE

          COMPETENT MEDICAL CAUSE OF THIS INJURY/ILLNESS?    YES

          ARE THE PATIENT'S COMPLAINTS CONSISTENT WITH HIS/HER HISTORY OF

          THE INJURY/ILLNESS?    YES

          IS THE PATIENT'S HISTORY OF THE INJURY/ILLNESS CONSISTENT WITH

          YOUR OBJECTIVE FINDING?    YES

          WHAT IS THE PERCENTAGE OF TEMPORARY IMPAIRMENT?    MODERATE TO

          MARKED = 66.7%

          IS THE PATIENT WORKING?    YES

          DOCTOR ON SITE:    PEDRO MILLER MD

      PN SI

          PRE PROCEDURE DIAGNOSIS    SACROILIITIS, SACROILIAC JOINT

          DYSFUNCTION

          POST PROCEDURE DIAGNOSIS    SACROILIITIS, SACROILIAC JOINT

          DYSFUNCTION

          PROCEDURE    BILATERAL SACROILIAC JOINT BLOCK

          SURGEON    DR. PEDRO HERNÁNDEZ

          ASSISTANT    NONE

          ANESTHESIA    LOCAL

          PRE PROCEDURE NOTE    PATIENT WITH HISTORY OF CHRONIC LOW BACK

          PAIN. I EVALUATED THE PATIENT AND REVIEWED THE CHART. I WENT OVER

          THE RISKS, ALTERNATIVES AND BENEFITS ASSOCIATED WITH THIS

          PROCEDURE. THE PATIENT WOULD LIKE TO PROCEED AND GAVE CONSENT TO

          PERFORM THE PROCEDURE. THE PATIENT DENIES UNEXPLAINABLE WEIGHT

          LOSS, FEVER, CHILLS, OR NEW CHANGES IN URINARY OR BOWEL CONTROL

          DESCRIPTION OF PROCEDURE    THE PATIENT WAS BROUGHT TO THE

          PROCEDURE ROOM AND PLACED IN THE PRONE POSITION. THE LUMBOSACRAL

          AREA WAS CLEANED WITH CHLORAPREP SOLUTION AND DRAPED ASEPTICALLY.

          THE PROCEDURE WAS DONE UNDER STERILE CONDITIONS. I CHECKED

          LATERALITY AND THE LEVEL WHERE THE PROCEDURE WAS GOING TO BE

          PERFORMED WITH THE PATIENT AND THE SUPPORTING STAFF AT THE MOMENT

          OF THE TIME OUT IN THE PROCEDURE ROOM. UNDER FLUOROSCOPIC

          GUIDANCE, TARGET POINT WAS SELECTED AT THE LOWER BORDER OF THE

          RIGHT AND LEFT SACROILIAC JOINTS. TARGET POINT WAS SELECTED AFTER

          MEDIAL ROTATION AND TILT OF THE MAGNIFIER OF THE C-ARM. LIDOCAINE

          WAS USED TO NUMB THE SKIN AND SUBCUTANEOUS TISSUE BELOW IT. A

          SPINAL NEEDLE, 22-GAUGE, WAS ADVANCED UNDER FLUOROSCOPIC GUIDANCE

          AND FOLLOWING PATIENT FEEDBACK UNTIL THE TARGET AREA WAS TOUCHED.

          THE POSITION OF THE NEEDLE WAS VERIFIED WITH AP AND LATERAL

          VIEWS. AFTER PROPER POSITION OF THE NEEDLE WAS ACHIEVED, ISOVUE M

          DYE 30%, 0.25 ML, WAS INJECTED SHOWING SPREAD OF THE DYE. THEN, A

          SOLUTION OF 30 MG OF KENALOG WAS INJECTED IN RIGHT AND LEFT

          JOINTS WITH 3 ML OF BUPIVACAINE 0.125%. THERE WAS NO EVIDENCE OF

          BLOOD, PARESTHESIA OR CEREBROSPINAL FLUID DURING THE PROCEDURE.

          THE PATIENT WAS SENT TO THE RECOVERY ROOM. THE PATIENT WAS MOVING

          THE EXTREMITIES AND DOING WELL. THERE WAS NO COMPLICATION DURING

          THE PROCEDURE. FLUOROSCOPY TIME WAS 13 SECONDS

          POST PROCEDURE NOTE    THE PATIENT WILL BE SEEN IN A FOLLOW UP IN

          THE NEXT FEW WEEKS. I AM LOOKING FOR LONG-LASTING PAIN RELIEF

          WITH THIS PROCEDURE. INSTRUCTIONS WERE GIVEN, QUESTIONS WERE

          ANSWERED, AND THE PATIENT EXPRESSED UNDERSTANDING AND AGREED WITH

          THE PLAN. I, CATERINA GUERRERO, DOCUMENTED THE ABOVE INFORMATION ACTING

          AS A SCRIBE FOR DR. HERNÁNDEZ. I HAVE REVIEWED THE ABOVE DOCUMENT,

          WRITTEN BY NELLY DE JESUS, AND I VERIFY THAT IT IS ACCURATE

           

PROCEDURE CODES

           

          29790 INJECT SACROILIAC JOINT, MODIFIERS: 50

           

          6045F RADXPS IN END [id number] PXD

           

DISPOSITION & COMMUNICATION

           

FOLLOW UP

           

          3 WEEKS

           

 

ELECTRONICALLY SIGNED BY PEDRO HERNÁNDEZ MD, MD ON

          2020 AT 01:13 PM EST

           

           

           

 

DISCLAIMER :

THIS IS A VISIT SUMMARY EXTRACTED FROM THE [employer] CHART.

IT IS NOT A COPY OF THE [employer] PROGRESS NOTE.

MTDMICHELLE

## 2020-02-12 ENCOUNTER — HOSPITAL ENCOUNTER (OUTPATIENT)
Dept: HOSPITAL 53 - M PAIN | Age: 65
End: 2020-02-12
Attending: NURSE PRACTITIONER
Payer: COMMERCIAL

## 2020-02-12 DIAGNOSIS — Z91.040: ICD-10-CM

## 2020-02-12 DIAGNOSIS — Z79.891: ICD-10-CM

## 2020-02-12 DIAGNOSIS — Z88.8: ICD-10-CM

## 2020-02-12 DIAGNOSIS — Z88.1: ICD-10-CM

## 2020-02-12 DIAGNOSIS — J45.909: ICD-10-CM

## 2020-02-12 DIAGNOSIS — Z88.0: ICD-10-CM

## 2020-02-12 DIAGNOSIS — Z91.011: ICD-10-CM

## 2020-02-12 DIAGNOSIS — M46.1: Primary | ICD-10-CM

## 2020-02-12 DIAGNOSIS — Z88.2: ICD-10-CM

## 2020-02-12 DIAGNOSIS — K58.0: ICD-10-CM

## 2020-02-12 DIAGNOSIS — E11.9: ICD-10-CM

## 2020-02-12 DIAGNOSIS — J32.9: ICD-10-CM

## 2020-02-12 DIAGNOSIS — Z79.899: ICD-10-CM

## 2020-02-28 NOTE — ECWPNPC
PATIENT NAME: JERRY MILLER

: 1955

GENDER: FEMALE

MRN: [id number]

VISIT DATE: 2020

DISCHARGE DATE: 20 1344

VISIT LOCKED DATE TIME: 

PHYSICIAN: MIKE LIZAMA

RESOURCE: MIKE LIZAMA

 

           

           

REASON FOR APPOINTMENT

           

          1. FOLLOW UP

           

HISTORY OF PRESENT ILLNESS

           

      HISTORY OF PRESENT ILLNESS:  HERE FOR POST

          PROCEDURE FOLLOW-UP. HAD BILATERAL SACROILIAC JOINT BLOCK ON

          01/15/2020. REPORTING SIGNIFICANT REDUCTION IN PAIN THAT

          CONTINUES TODAY. CHIEF AREA OF PAIN IS CENTRAL LOW BACK.

          DESCRIBES PAIN AS ACHING. RATING PAIN LEVEL 2/10 VAS.

      PAIN

           

           

          THE PATIENT DESCRIBES THE PAIN...

           

      FALL RISK SCREENING:

      SCREENING

           

           

          :NO FALLS REPORTED IN THE LAST YEAR

           

CURRENT MEDICATIONS

           

          TAKING ALLOPURINOL 300 MG TABLET 1 TABLET ORALLY ONCE A DAY

          TAKING SINGULAIR 10 MG TABLET 1 TABLET IN THE EVENING ORALLY ONCE

          A DAY

          TAKING VENTOLIN  (90 BASE) MCG/ACT AEROSOL SOLUTION 2

          PUFFS INHALATION AS NEEDED

          TAKING VITAMIN B COMPLEX CAPSULE AS DIRECTED ORALLY DAILY

          TAKING HYDROCHLOROTHIAZIDE 25 25 MG TABLET 1 TABLET ORAL DAILY

          TAKING LATANOPROST 0.005 % SOLUTION 1 DROP INTO BOTH EYES EVENING

          OPHTHALMIC ONCE A DAY

          TAKING SYMBICORT 80-4.5 MCG/ACT AEROSOL 2 PUFFS INHALATION TWICE

          A DAY

          TAKING REFRESH CELLUVISC 1 % SOLUTION 1 DROP OU OPHTHALMIC 24

          TIME(S) A DAY

          TAKING CRANBERRY 405 MG CAPSULE ORALLY DAILY

          TAKING FLONASE ALLERGY RELIEF 50 MCG/ACT SUSPENSION 1 SPRAY IN

          EACH NOSTRIL NASALLY ONCE A DAY AS NEEDED

          TAKING OXYBUTYNIN CHLORIDE 5 MG TABLET 1 TABLET ORALLY 2 TIMES A

          DAY

          TAKING POTASSIUM CHLORIDE 10 MEQ CAPSULE EXTENDED RELEASE 1

          CAPSULE WITH FOOD ORALLY ONCE A DAY

          TAKING PROBIOTIC - CAPSULE ORALLY DAILY

          TAKING OMEPRAZOLE 20 MG CAPSULE DELAYED RELEASE 1 CAP ORALLY ONCE

          A DAY

          TAKING CABERGOLINE 0.5 MG TABLET 1 TAB ORALLY WEEKLY

          TAKING ZYRTEC ALLERGY 10 MG TABLET 1 TABLET ORALLY ONCE A DAY

          TAKING ALEVE 220 MG TABLET 1 TABLET WITH FOOD OR MILK AS NEEDED

          ORALLY EVERY 12 HRS

          TAKING GABAPENTIN 300 MG CAPSULE 1 CAPSULE ORALLY SPECIAL FUNDS

          TWO TIMES A DAY FOR PAINMDD2

          TAKING CYMBALTA 30 MG CAPSULE DELAYED RELEASE PARTICLES 1 CAPSULE

          ORALLY FOR PAIN TWICE A DAY MDD2

          TAKING TRAMADOL HCL 50 MG TABLET 1 TABLET AS NEEDED ORALLY Q8H

          PRN MDD3

          TAKING SKELAXIN 800 MG TABLET 1 TABLET ORALLY BID PRN FOR SPASMS

          AND PAIN MDD 2

          NOT-TAKING FIBER 58.6 % POWDER 2 CAPSULES WITH 8 OUNCES OF LIQUID

          ORALLY DAILY

          NOT-TAKING CLINDAMYCIN  MG CAPSULE 1 CAPSULE ORALLY BID

          NOT-TAKING BENTYL 20 MG TABLET 1 TABLET ORALLY EVERY 4 HOURS AS

          NEEDED

          NOT-TAKING BENTYL 10 MG CAPSULE 1 CAPSULE ORALLY TWO TIMES A DAY

          NOT-TAKING XIIDRA 5 % SOLUTION 1 DROP INTO AFFECTED EYE

          OPHTHALMIC TWICE A DAY

          NOT-TAKING PREMARIN 0.625 MG/GM CREAM VAGINAL THREE TIMES A WEEK

          MEDICATION LIST REVIEWED AND RECONCILED WITH THE PATIENT

           

PAST MEDICAL HISTORY

           

          DIABETES

          BLOOD PRESSURE

          ASTHMA

          PITUITARY ADENOMA

          ARTHRITIS

          RUPTURED DISC/BACK PAIN

          IBS WITH DIARRHEA

          OPTIC DAMAGE DUE TO HYPERTENSION

          CHRONIC SINUSITIS

          TICK BITE X2 IN SEPTEMBER - TESTE FOR LYME, AWAITING RESULTS

          19

           

ALLERGIES

           

          PENICILLIN (FOR ALLERGIES USE ONLY): RASH - ALLERGY

          SULFA (FOR ALLERGY USE ONLY): RASH - ALLERGY

          PHENERGAN: BLOOD CLOT - SIDE EFFECTS

          LATEX CONDOMS, RUBBER BANDS, GLOVES: RASH - ALLERGY

          ERYTHROMYCIN: RASH - ALLERGY

          NICKEL: RASH - ALLERGY

          SULFITES: DYSPNEA - ALLERGY

          MILK: GI UPSET, WATERY EYES AND SINUS CONGERSTION - SIDE EFFECTS

          DOXYCYCLINE: HIVES - ALLERGY

           

SURGICAL HISTORY

           

          BTL 

          GB 

          ACL 

          RIGHT ROTATOR CUFF 2015

          LEFT TOTAL KNEE REPLACEMENT 2017

           

FAMILY HISTORY

           

          FATHER: , DIAGNOSED WITH OTHER MALIGNANT NEOPLASM OF

          UNSPECIFIED SITE

          MOTHER: , DIABETES, OTHER SPECIFIED CONDITIONS

          INFLUENCING HEALTH STATUS

          1 SON(S) , 1 DAUGHTER(S) .

          DAD-SKIN CA\NMOM-COPD, PULMONARY FIBROSIS\NSON AND

          DAUGHTER-ASTHMA \NHUSBAND -  ANGELA PASSED AWAY

          IN  - BONE DISORDER.

           

SOCIAL HISTORY

           

          GENERAL:

           

          TOBACCO USE  ARE YOU A: NONSMOKER.

           

           

          PAIN CLINIC PFS, CLERGY, PUBLIC HEALTH REFERRALS

          PFS REFERRAL NEEDED?NO

          CLERGY REFERRAL NEEDED?NO

          PUBLIC HEALTH REFERRAL NEEDED?NO

          WAS THE PROVIDER NOTIFIED OF ANY PERTINENT INFO?YES

          HAS THE PATIENT BEEN EDUCATED REGARDING HIS/HER PLAN OF CARE?YES

          HAS THE PATIENT BEEN EDUCATED REGARDING PAIN, THE RISK FOR PAIN,

          THE IMPORTANCE OF EFFECTIVE PAIN MANAGEMENT, AND THE PAIN

          ASSESSMENT PROCESS?YES

           

           

          LATEX QUESTIONNAIRE

          LATEX ALLERGY : HAVE YOU EVER DEVELOPED ANY TYPE OF REACTION

          AFTER HANDLING LATEX PRODUCTS SUCH AS RUBBER GLOVES, CONDOMS,

          DIAPHRAGMS, BALLOONS, SOCKS, OR UNDERWEAR?YES

          - PLEASE INDICATE :RUBBER GLOVES, CONDOMS

          LATEX ALLERGY : HAVE YOU EVER DEVELOPED ANY TYPE OF REACTION

          DURING OR AFTER DENTAL APPOINTMENT, VAGINAL/RECTAL EXAMINATION,

          SURGICAL PROCEDURE, OR ANY OTHER EXPOSURE?NO

          LATEX RISK : HAVE YOU EVER HAD ANY DIFFICULTY BREATHING OR HIVES

          AFTER EATING OR HANDLING ANY FRUITS, OR VEGETABLES; SUCH AS KIWI,

          BANANAS, STONE FRUITS, OR CHESTNUTSNO

          LATEX RISK : DO YOU HAVE A PREVIOUS PERSONAL HISTORY OF MORE THAN

          NINE SURGERIES, SPINA BIFIDA, OR REPEATED CATHERIZATIONS? NO

          LATEX RISK : ARE YOU FREQUENTLY EXPOSED TO LATEX PRODUCTS IN YOUR

          OCCUPATION?NO

          DATE ASKED : 2019 LATEX ALLERGY

           

           

          CAFFEINE

          CAFFEINE USE?YES

          HOW OFTEN AND HOW MUCH? 1-2 CUPS COFFEE/DAY

           

           

          ADVANCE DIRECTIVE

          ADVANCE DIRECTIVE DISCUSSED WITH PATIENT:YES HCP - LORIGEETA MORALES

          [phone]

           

           

          [employer]

          [id number] [employer]

           

           

          LANGUAGE

          LANGUAGES SPOKEN:ENGLISH

           

           

          DOMESTIC VIOLENCE

          DO YOU FEEL SAFE IN YOUR ENVIRONMENT?YES

           

           

          NEW PATIENT PAIN DIARY

          TODAY'S VISITNOTES

          FROM 0-10, WHAT LEVEL IS YOUR PAIN TODAY?7

           

           

          ALCOHOL SCREENING

          DID YOU HAVE A DRINK CONTAINING ALCOHOL IN THE PAST YEAR?NO

          POINTS0

          INTERPRETATIONNEGATIVE

           

           

          RECREATIONAL DRUG USE

          DRUG USE?NO

           

           

          LEARNING BARRIERS / SPECIAL NEEDS

          CHANGE FROM LAST VISIT?NO

          BARRIERS TO LEARNING?NO

          HEARING IMPAIRED?NO

          VISION IMPAIRED?YES ENLARGED OPTIC NERVE RELATED TO GESTATIONAL

          HYPERTENSION.

          COGNITIVELY IMPAIRED?NO

          :CORRECTIVE LENSES

          READINESS TO LEARN?YES

          LEARNING PREFERENCES?NO

          LEARNING CAPABILITIES PRESENT?YES

          EMOTIONAL BARRIERS?NO

          SPECIAL DEVICES?NO

           NEEDED?NO

           

           

          18 1050 REVIEWED WITH PT. AD 18 1413 BV REVIEWED WITH

          PTREVIEWED WITH PATIENT 19 1113 JSREVIEWED WITH PATEINT

          19 1352 NLJREVIEWED WITH PATIENT 10/22/19 1506 JSREVIEWED

          WITH PATIENT 19 1357 JSREVIEWED WITH PATIENT 2020 1315

          JS.

           

HOSPITALIZATION/MAJOR DIAGNOSTIC PROCEDURE

           

          SURGERY RELATED

          ASTHMA

           

REVIEW OF SYSTEMS

           

      REVIEWED BY:

           

          PROVIDER:    MIKE WESLEY .

           

      CONSTITUTIONAL:

           

          ANY CHANGE IN YOUR MEDICAL CONDITION?    NO . CHILLS    NO .

          FEVER    NO .

           

      INFECTION:

           

          DO YOU HAVE NEW INFECTIONS?    NO . DO YOU HAVE HISTORY OF MRSA? 

            NO .

           

      MUSCULOSKELETAL:

           

          ANY NEW PATTERNS OF PAIN OR NUMBNESS?    NO .

           

      GASTROENTEROLOGY:

           

          ANY NEW CHANGE IN BOWEL CONTROL?    NO .

           

      GENITOURINARY:

           

          ANY NEW CHANGE IN BLADDER CONTROL?    NO . IS THERE A CHANCE YOU

          COULD BE PREGNANT?    NO .

           

      HEMATOLOGY/LYMPH:

           

          DO YOU TAKE ANY BLOOD THINNERS? (FOR EXAMPLE- COUMADIN, PLAVIX,

          AGGRENOX, PLATEL, PRADAXA, OR XARELTO)    NO . WHEN WAS YOUR LAST

          DOSE?    DATE: TIME:  .

           

      NEUROLOGY:

           

          HAVE YOU FALLEN IN THE PAST 12 MONTHS?    NO . ANY NEW EXTREMITY

          NUMBNESS OR WEAKNESS?    NO .

           

      CARDIOLOGY:

           

          DO YOU HAVE A PACEMAKER OR DEFIBRILLATOR?    NO .

           

      RESPIRATORY:

           

          HAVE YOU BEEN SICK IN THE PAST WEEK?    NO . FEVER    NO . FLU

          LIKE SYMPTOMS?    NO . COUGH    NO .

           

      INTEGUMENTARY:

           

          DO YOU HAVE ANY RASHES OR OPEN SORES?    NO .

           

      ALLERGIC/IMMUNO:

           

          ARE YOU ALLERGIC TO IV DYE?    NO . ANY NEW ALLERGIES?    NO .

           

      PSYCHIATRIC:

           

          DO YOU HAVE THOUGHTS OF HURTING YOURSELF OR SOMEONE ELSE?    NO .

          ARE YOU ABUSED, NEGLECTED, OR IN AN UNSAFE ENVIRONMENT?    NO .

           

      ENDOCRINOLOGY:

           

          ARE YOU DIABETIC?    NO .

           

      OTHER:

           

          DO YOU NEED ANY PRESCRIPTIONS?    NO . IF YES, PLEASE LIST:   

          ____ . ANY NEW PROBLEMS WITH YOUR MEDICATIONS?    NO . WHEN DID

          YOU LAST EAT?    ____ . WHEN DID YOU LAST DRINK?    ____ . WHAT

          DID YOU LAST DRINK?    ____ . NAME OF PERSON DRIVING YOU HOME?   

          ____ . DO YOU HAVE ANY OTHER QUESTIONS OR CONCERNS    NO .

           

VITAL SIGNS

           

          .6 LBS, HT 65", BMI 32.21 INDEX, /72 MM HG, HR 64

          /MIN, RR 18 /MIN, TEMP 96.8 F, OXYGEN SAT % 99%, SAFE IN ENV?

          (Y/N) YES, NA INITIALS MS 1313, REVIEWED BY: PAO.

           

EXAMINATION

           

      GENERAL EXAMINATION:

          GENERALAWAKE,ALERT ,PLEASANT .

           

          PSYCHAFFECT NORMAL .

           

          LUNGS:LUNG EVANS ARE CLEAR TO AUSCULTATION BILATERALLY. GOOD

          MOVEMENT OF AIR .

           

          HEART:S1, S2 IN A REGULAR RATE AND RHYTHM. NO SIGNIFICANT

          MURMURS, RUBS OR GALLOPS NOTED .

           

ASSESSMENTS

           

          SACROILIITIS, NOT ELSEWHERE CLASSIFIED - M46.1 (PRIMARY)

           

TREATMENT

           

      SACROILIITIS, NOT ELSEWHERE CLASSIFIED

          CONTINUE CYMBALTA CAPSULE DELAYED RELEASE PARTICLES, 30 MG, 1

          CAPSULE, ORALLY FOR PAIN, TWICE A DAY MDD2

           

PROCEDURE CODES

           

           ESTABILISHED PATIENT [facility] CHARGE

           

DISPOSITION & COMMUNICATION

           

FOLLOW UP

           

          3 MONTHS (REASON: W/C FOLLOW UP)

           

 

ELECTRONICALLY SIGNED BY LUPILLO JERONIMO ON

          2020 AT 12:54 PM EST

           

           

           

 

DISCLAIMER :

THIS IS A VISIT SUMMARY EXTRACTED FROM THE [employer] CHART.

IT IS NOT A COPY OF THE [employer] PROGRESS NOTE.

AGUSTINA

## 2020-03-22 ENCOUNTER — HOSPITAL ENCOUNTER (OUTPATIENT)
Dept: HOSPITAL 53 - M LABSMTC | Age: 65
End: 2020-03-22
Attending: FAMILY MEDICINE
Payer: COMMERCIAL

## 2020-03-22 DIAGNOSIS — Z20.828: Primary | ICD-10-CM

## 2020-03-22 PROCEDURE — 87486 CHLMYD PNEUM DNA AMP PROBE: CPT

## 2020-03-22 PROCEDURE — 87633 RESP VIRUS 12-25 TARGETS: CPT

## 2020-03-22 PROCEDURE — 87798 DETECT AGENT NOS DNA AMP: CPT

## 2020-03-22 PROCEDURE — 87581 M.PNEUMON DNA AMP PROBE: CPT

## 2020-04-08 ENCOUNTER — HOSPITAL ENCOUNTER (OUTPATIENT)
Dept: HOSPITAL 53 - M PAIN | Age: 65
End: 2020-04-08
Attending: NURSE PRACTITIONER
Payer: COMMERCIAL

## 2020-04-08 DIAGNOSIS — Z79.899: ICD-10-CM

## 2020-04-08 DIAGNOSIS — M47.816: ICD-10-CM

## 2020-04-08 DIAGNOSIS — Z88.2: ICD-10-CM

## 2020-04-08 DIAGNOSIS — Z91.011: ICD-10-CM

## 2020-04-08 DIAGNOSIS — Z91.048: ICD-10-CM

## 2020-04-08 DIAGNOSIS — Z91.040: ICD-10-CM

## 2020-04-08 DIAGNOSIS — Z88.1: ICD-10-CM

## 2020-04-08 DIAGNOSIS — Z88.8: ICD-10-CM

## 2020-04-08 DIAGNOSIS — Z88.0: ICD-10-CM

## 2020-04-08 DIAGNOSIS — M46.1: Primary | ICD-10-CM

## 2020-04-08 DIAGNOSIS — Z79.891: ICD-10-CM

## 2020-04-09 NOTE — ECWPNPC
PATIENT NAME: JERRY MILLER

: 1955

GENDER: FEMALE

MRN: [id number]

VISIT DATE: 2020

DISCHARGE DATE: 20 1021

VISIT LOCKED DATE TIME: 

PHYSICIAN: MIKE LIZAMA

RESOURCE: MIKE LIZAMA

 

           

           

REASON FOR APPOINTMENT

           

          1. 2 MONTH

           

HISTORY OF PRESENT ILLNESS

           

      HISTORY OF PRESENT ILLNESS:  HERE FOR FOLLOW-UP OF

          CHRONIC LOW BACK PAIN. THIS IS A WORK RELATED INJURY WITH DATE OF

          INJURY 2005. RATING PAIN VAS 4/10. PAIN IS DESCRIBED AS

          CONSTANT AND ACHING ACROSS LOW BACK. PAIN IS AGGRAVATED WITH

          INCREASED ACTIVITY. FINDS CURRENT CHRONIC PAIN MEDICATIONS

          HELPFUL AT REDUCING PAIN AND KEEPING HER FUNCTIONAL. DENIES

          ADVERSE SIDE EFFECTS.

      PAIN

           

           

          THE PATIENT DESCRIBES THE PAIN...

           

      FALL RISK SCREENING:

      SCREENING

           

           

          :NO FALLS REPORTED IN THE LAST YEAR

           

CURRENT MEDICATIONS

           

          TAKING ALLOPURINOL 300 MG TABLET 1 TABLET ORALLY ONCE A DAY

          TAKING SINGULAIR 10 MG TABLET 1 TABLET IN THE EVENING ORALLY ONCE

          A DAY

          TAKING VENTOLIN  (90 BASE) MCG/ACT AEROSOL SOLUTION 2

          PUFFS INHALATION AS NEEDED

          TAKING VITAMIN B COMPLEX CAPSULE AS DIRECTED ORALLY DAILY

          TAKING HYDROCHLOROTHIAZIDE 25 25 MG TABLET 1 TABLET ORAL DAILY

          TAKING LATANOPROST 0.005 % SOLUTION 1 DROP INTO BOTH EYES EVENING

          OPHTHALMIC ONCE A DAY

          TAKING SYMBICORT 80-4.5 MCG/ACT AEROSOL 2 PUFFS INHALATION TWICE

          A DAY

          TAKING REFRESH CELLUVISC 1 % SOLUTION 1 DROP OU OPHTHALMIC 24

          TIME(S) A DAY

          TAKING CRANBERRY 405 MG CAPSULE ORALLY DAILY

          TAKING FLONASE ALLERGY RELIEF 50 MCG/ACT SUSPENSION 1 SPRAY IN

          EACH NOSTRIL NASALLY ONCE A DAY AS NEEDED

          TAKING OXYBUTYNIN CHLORIDE 5 MG TABLET 1 TABLET ORALLY 2 TIMES A

          DAY

          TAKING POTASSIUM CHLORIDE 10 MEQ CAPSULE EXTENDED RELEASE 1

          CAPSULE WITH FOOD ORALLY ONCE A DAY

          TAKING PROBIOTIC - CAPSULE ORALLY DAILY

          TAKING OMEPRAZOLE 20 MG CAPSULE DELAYED RELEASE 1 CAP ORALLY ONCE

          A DAY AS NEEDED

          TAKING CABERGOLINE 0.5 MG TABLET 1 TAB ORALLY WEEKLY

          TAKING ZYRTEC ALLERGY 10 MG TABLET 1 TABLET ORALLY ONCE A DAY

          TAKING ALEVE 220 MG TABLET 1 TABLET WITH FOOD OR MILK AS NEEDED

          ORALLY EVERY 12 HRS

          TAKING GABAPENTIN 300 MG CAPSULE 1 CAPSULE ORALLY SPECIAL FUNDS

          TWO TIMES A DAY FOR PAINMDD2

          TAKING TRAMADOL HCL 50 MG TABLET 1 TABLET AS NEEDED ORALLY Q8H

          PRN MDD3

          TAKING CYMBALTA 30 MG CAPSULE DELAYED RELEASE PARTICLES 1 CAPSULE

          ORALLY FOR PAIN TWICE A DAY MDD2

          TAKING SKELAXIN 800 MG TABLET 1 TABLET ORALLY BID PRN FOR SPASMS

          AND PAIN MDD 2

          NOT-TAKING FIBER 58.6 % POWDER 2 CAPSULES WITH 8 OUNCES OF LIQUID

          ORALLY DAILY

          NOT-TAKING CLINDAMYCIN  MG CAPSULE 1 CAPSULE ORALLY BID

          NOT-TAKING BENTYL 20 MG TABLET 1 TABLET ORALLY EVERY 4 HOURS AS

          NEEDED

          NOT-TAKING BENTYL 10 MG CAPSULE 1 CAPSULE ORALLY TWO TIMES A DAY

          NOT-TAKING XIIDRA 5 % SOLUTION 1 DROP INTO AFFECTED EYE

          OPHTHALMIC TWICE A DAY

          NOT-TAKING PREMARIN 0.625 MG/GM CREAM VAGINAL THREE TIMES A WEEK

          MEDICATION LIST REVIEWED AND RECONCILED WITH THE PATIENT

           

PAST MEDICAL HISTORY

           

          DIABETES

          BLOOD PRESSURE

          ASTHMA

          PITUITARY ADENOMA

          ARTHRITIS

          RUPTURED DISC/BACK PAIN

          IBS WITH DIARRHEA

          OPTIC DAMAGE DUE TO HYPERTENSION

          CHRONIC SINUSITIS

          TICK BITE X2 IN SEPTEMBER - TESTE FOR LYME, AWAITING RESULTS

          19

           

ALLERGIES

           

          PENICILLIN (FOR ALLERGIES USE ONLY): RASH - ALLERGY

          SULFA (FOR ALLERGY USE ONLY): RASH - ALLERGY

          PHENERGAN: BLOOD CLOT - SIDE EFFECTS

          LATEX CONDOMS, RUBBER BANDS, GLOVES: RASH - ALLERGY

          ERYTHROMYCIN: RASH - ALLERGY

          NICKEL: RASH - ALLERGY

          SULFITES: DYSPNEA - ALLERGY

          MILK: GI UPSET, WATERY EYES AND SINUS CONGERSTION - SIDE EFFECTS

          DOXYCYCLINE: HIVES - ALLERGY

           

SURGICAL HISTORY

           

          BTL 

          GB 

          ACL 

          RIGHT ROTATOR CUFF 2015

          LEFT TOTAL KNEE REPLACEMENT 2017

           

FAMILY HISTORY

           

          FATHER: , DIAGNOSED WITH OTHER MALIGNANT NEOPLASM OF

          UNSPECIFIED SITE

          MOTHER: , DIABETES, OTHER SPECIFIED CONDITIONS

          INFLUENCING HEALTH STATUS

          1 SON(S) , 1 DAUGHTER(S) .

          DAD-SKIN CA\NMOM-COPD, PULMONARY FIBROSIS\NSON AND

          DAUGHTER-ASTHMA \NHUSBAND -  ANGELA PASSED AWAY

          IN  - BONE DISORDER.

           

SOCIAL HISTORY

           

          GENERAL:

           

          TOBACCO USE  ARE YOU A: NONSMOKER.

           

           

          PAIN CLINIC PFS, CLERGY, PUBLIC HEALTH REFERRALS

          PFS REFERRAL NEEDED?NO

          CLERGY REFERRAL NEEDED?NO

          PUBLIC HEALTH REFERRAL NEEDED?NO

          WAS THE PROVIDER NOTIFIED OF ANY PERTINENT INFO?YES

          HAS THE PATIENT BEEN EDUCATED REGARDING HIS/HER PLAN OF CARE?YES

          HAS THE PATIENT BEEN EDUCATED REGARDING PAIN, THE RISK FOR PAIN,

          THE IMPORTANCE OF EFFECTIVE PAIN MANAGEMENT, AND THE PAIN

          ASSESSMENT PROCESS?YES

           

           

          LATEX QUESTIONNAIRE

          LATEX ALLERGY : HAVE YOU EVER DEVELOPED ANY TYPE OF REACTION

          AFTER HANDLING LATEX PRODUCTS SUCH AS RUBBER GLOVES, CONDOMS,

          DIAPHRAGMS, BALLOONS, SOCKS, OR UNDERWEAR?YES

          - PLEASE INDICATE :RUBBER GLOVES, CONDOMS, UNDERWEAR

          LATEX ALLERGY : HAVE YOU EVER DEVELOPED ANY TYPE OF REACTION

          DURING OR AFTER DENTAL APPOINTMENT, VAGINAL/RECTAL EXAMINATION,

          SURGICAL PROCEDURE, OR ANY OTHER EXPOSURE?NO

          LATEX RISK : HAVE YOU EVER HAD ANY DIFFICULTY BREATHING OR HIVES

          AFTER EATING OR HANDLING ANY FRUITS, OR VEGETABLES; SUCH AS KIWI,

          BANANAS, STONE FRUITS, OR CHESTNUTSNO

          LATEX RISK : DO YOU HAVE A PREVIOUS PERSONAL HISTORY OF MORE THAN

          NINE SURGERIES, SPINA BIFIDA, OR REPEATED CATHERIZATIONS? NO

          LATEX RISK : ARE YOU FREQUENTLY EXPOSED TO LATEX PRODUCTS IN YOUR

          OCCUPATION?NO

          DATE ASKED : 2020 LATEX ALLERGY

           

           

          CAFFEINE

          CAFFEINE USE?YES

          HOW OFTEN AND HOW MUCH? 1-2 CUPS COFFEE/DAY

           

           

          ADVANCE DIRECTIVE

          ADVANCE DIRECTIVE DISCUSSED WITH PATIENT:YES HCP - LORI MORALES

          [phone]

           

           

          Sabianist

          [id number] Jewish

           

           

          LANGUAGE

          LANGUAGES SPOKEN:ENGLISH

           

           

          DOMESTIC VIOLENCE

          DO YOU FEEL SAFE IN YOUR ENVIRONMENT?YES

           

           

          NEW PATIENT PAIN DIARY

          TODAY'S VISITNOTES 2020

          PATIENT DESCRIBES PAIN AS:ACHING, HAVE IT ALL THE TIME, OTHER

          STIFFNESS

          FROM 0-10, WHAT LEVEL IS YOUR PAIN TODAY?4

           

           

          ALCOHOL SCREENING

          DID YOU HAVE A DRINK CONTAINING ALCOHOL IN THE PAST YEAR?NO

          POINTS0

          INTERPRETATIONNEGATIVE

           

           

          RECREATIONAL DRUG USE

          DRUG USE?NO

           

           

          LEARNING BARRIERS / SPECIAL NEEDS

          CHANGE FROM LAST VISIT?NO

          BARRIERS TO LEARNING?NO

          HEARING IMPAIRED?NO

          VISION IMPAIRED?YES ENLARGED OPTIC NERVE RELATED TO GESTATIONAL

          HYPERTENSION.

          COGNITIVELY IMPAIRED?NO

          :CORRECTIVE LENSES

          READINESS TO LEARN?YES

          LEARNING PREFERENCES?NO

          LEARNING CAPABILITIES PRESENT?YES

          EMOTIONAL BARRIERS?NO

          SPECIAL DEVICES?NO

           NEEDED?NO

           

HOSPITALIZATION/MAJOR DIAGNOSTIC PROCEDURE

           

          SURGERY RELATED

          ASTHMA

           

REVIEW OF SYSTEMS

           

      REVIEWED BY:

           

          PROVIDER:    MIKE WESLEY .

           

      CONSTITUTIONAL:

           

          ANY CHANGE IN YOUR MEDICAL CONDITION?    NO . CHILLS    NO .

          FEVER    NO .

           

      INFECTION:

           

          DO YOU HAVE NEW INFECTIONS?    NO . DO YOU HAVE HISTORY OF MRSA? 

            NO .

           

      MUSCULOSKELETAL:

           

          ANY NEW PATTERNS OF PAIN OR NUMBNESS?    NO .

           

      GASTROENTEROLOGY:

           

          ANY NEW CHANGE IN BOWEL CONTROL?    NO .

           

      GENITOURINARY:

           

          ANY NEW CHANGE IN BLADDER CONTROL?    NO . IS THERE A CHANCE YOU

          COULD BE PREGNANT?    NO .

           

      HEMATOLOGY/LYMPH:

           

          DO YOU TAKE ANY BLOOD THINNERS? (FOR EXAMPLE- COUMADIN, PLAVIX,

          AGGRENOX, PLATEL, PRADAXA, OR XARELTO)    NO . WHEN WAS YOUR LAST

          DOSE?    DATE: TIME:  .

           

      NEUROLOGY:

           

          HAVE YOU FALLEN IN THE PAST 12 MONTHS?    YES, STATES FELL OVER

          YESTERDAY WHILE PLANTING HER GARDEN, NO INJURIES, NO ED VISIT .

          ANY NEW EXTREMITY NUMBNESS OR WEAKNESS?    NO .

           

      CARDIOLOGY:

           

          DO YOU HAVE A PACEMAKER OR DEFIBRILLATOR?    NO .

           

      RESPIRATORY:

           

          HAVE YOU BEEN SICK IN THE PAST WEEK?    NO . FEVER    NO . FLU

          LIKE SYMPTOMS?    NO . COUGH    NO .

           

      INTEGUMENTARY:

           

          DO YOU HAVE ANY RASHES OR OPEN SORES?    NO .

           

      ALLERGIC/IMMUNO:

           

          ARE YOU ALLERGIC TO IV DYE?    NO . ANY NEW ALLERGIES?    NO .

           

      PSYCHIATRIC:

           

          DO YOU HAVE THOUGHTS OF HURTING YOURSELF OR SOMEONE ELSE?    NO .

          ARE YOU ABUSED, NEGLECTED, OR IN AN UNSAFE ENVIRONMENT?    NO .

           

      ENDOCRINOLOGY:

           

          ARE YOU DIABETIC?    NO .

           

      OTHER:

           

          DO YOU NEED ANY PRESCRIPTIONS?    YES . IF YES, PLEASE LIST:   

          ____TRAMDOL . ANY NEW PROBLEMS WITH YOUR MEDICATIONS?    NO .

          WHEN DID YOU LAST EAT?    ____ . WHEN DID YOU LAST DRINK?    ____

          . WHAT DID YOU LAST DRINK?    ____ . NAME OF PERSON DRIVING YOU

          HOME?    ____ . DO YOU HAVE ANY OTHER QUESTIONS OR CONCERNS    NO

          .

           

VITAL SIGNS

           

          .9 LBS, HT 65", BMI 34.26 INDEX, /72 MM HG, HR 71

          /MIN, RR 18 /MIN, TEMP 97.4 F, OXYGEN SAT % 100%, SAFE IN ENV?

          (Y/N) YES, NA INITIALS AW 0930, REVIEWED BY: PAO.

           

EXAMINATION

           

      GENERAL EXAMINATION:

          GENERALAWAKE,ALERT ,PLEASANT .

           

          PSYCHAFFECT NORMAL .

           

          LUNGS:LUNG EVANS ARE CLEAR TO AUSCULTATION BILATERALLY. GOOD

          MOVEMENT OF AIR .

           

          HEART:S1, S2 IN A REGULAR RATE AND RHYTHM. NO SIGNIFICANT

          MURMURS, RUBS OR GALLOPS NOTED .

           

ASSESSMENTS

           

          SACROILIITIS, NOT ELSEWHERE CLASSIFIED - M46.1 (PRIMARY)

           

          SPONDYLOSIS OF LUMBAR REGION WITHOUT MYELOPATHY OR RADICULOPATHY

          - M47.816

           

PROCEDURES

           

      PN WORKMANS' COMP OPINION

          IN YOUR OPINION, WAS THE INCIDENT THAT THE PATIENT DESCRIBED THE

          COMPETENT MEDICAL CAUSE OF THIS INJURY/ILLNESS?    YES

          ARE THE PATIENT'S COMPLAINTS CONSISTENT WITH HIS/HER HISTORY OF

          THE INJURY/ILLNESS?    YES

          IS THE PATIENT'S HISTORY OF THE INJURY/ILLNESS CONSISTENT WITH

          YOUR OBJECTIVE FINDING?    YES

          WHAT IS THE PERCENTAGE OF TEMPORARY IMPAIRMENT?    MODERATE TO

          MARKED = 66.7%

          IS THE PATIENT WORKING?    YES

          DOCTOR ON SITE:    PEDRO MILLER MD

           

PROCEDURE CODES

           

           ESTABILISHED PATIENT [facility] CHARGE

           

DISPOSITION & COMMUNICATION

           

FOLLOW UP

           

          PT WILL CALL (REASON: W/C)

           

 

ELECTRONICALLY SIGNED BY LUPILLO JERONIMO ON

          2020 AT 01:32 PM EDT

           

           

           

 

DISCLAIMER :

THIS IS A VISIT SUMMARY EXTRACTED FROM THE [employer] CHART.

IT IS NOT A COPY OF THE [employer] PROGRESS NOTE.

AGUSTINA

## 2020-07-31 ENCOUNTER — HOSPITAL ENCOUNTER (OUTPATIENT)
Dept: HOSPITAL 53 - M PAIN | Age: 65
End: 2020-07-31
Attending: NURSE PRACTITIONER
Payer: COMMERCIAL

## 2020-07-31 DIAGNOSIS — M54.5: Primary | ICD-10-CM

## 2020-09-14 ENCOUNTER — HOSPITAL ENCOUNTER (OUTPATIENT)
Dept: HOSPITAL 53 - M RAD | Age: 65
End: 2020-09-14
Attending: INTERNAL MEDICINE
Payer: MEDICARE

## 2020-09-14 DIAGNOSIS — J45.40: Primary | ICD-10-CM

## 2020-10-01 NOTE — REP
CHEST X-RAY: 2-VIEWS



HISTORY: Moderate persistent asthma. 



COMPARISON: Chest x-ray 06/19/2017. 



FINDINGS: 

The lungs are symmetrically aerated and remain clear. The pleural angles are 
sharp. Heart is not enlarged. There are mild degenerative changes in the 
thoracic spine. There are two metallic anchors in the right humeral head 
unchanged. No acute bony abnormality is seen. Pulmonary vasculature is not 
increased. Heart size is normal.   



IMPRESSION: 

No active disease.

MTDD

## 2021-01-08 ENCOUNTER — HOSPITAL ENCOUNTER (OUTPATIENT)
Dept: HOSPITAL 53 - M PAIN | Age: 66
End: 2021-01-08
Attending: NURSE PRACTITIONER
Payer: COMMERCIAL

## 2021-01-08 DIAGNOSIS — Z88.0: ICD-10-CM

## 2021-01-08 DIAGNOSIS — J45.909: ICD-10-CM

## 2021-01-08 DIAGNOSIS — Z91.048: ICD-10-CM

## 2021-01-08 DIAGNOSIS — E11.9: ICD-10-CM

## 2021-01-08 DIAGNOSIS — Z88.8: ICD-10-CM

## 2021-01-08 DIAGNOSIS — M47.816: ICD-10-CM

## 2021-01-08 DIAGNOSIS — M46.1: Primary | ICD-10-CM

## 2021-01-08 DIAGNOSIS — Z91.040: ICD-10-CM

## 2021-01-08 DIAGNOSIS — K58.0: ICD-10-CM

## 2021-01-08 DIAGNOSIS — Z88.2: ICD-10-CM

## 2021-01-08 DIAGNOSIS — Z91.011: ICD-10-CM

## 2021-01-08 DIAGNOSIS — Z79.899: ICD-10-CM

## 2021-01-12 NOTE — ECWPNPC
PATIENT NAME: JERRY MILLER

: 1955

GENDER: FEMALE

MRN: [id number]

VISIT DATE: 2021

DISCHARGE DATE: 21 1146

VISIT LOCKED DATE TIME: 

PHYSICIAN: MIKE LIZAMA

RESOURCE: MIKE LIZAMA

 

           

           

REASON FOR APPOINTMENT

           

          1. W/C LOW BACK.

           

HISTORY OF PRESENT ILLNESS

           

      DEPRESSION SCREENING:

      PHQ-2 (2015 EDITION)

           

           

          LITTLE INTEREST OR PLEASURE IN DOING THINGS?NOT AT ALL

          FEELING DOWN, DEPRESSED, OR HOPELESS?NOT AT ALL

          TOTAL SCORE0

           

      GENERAL:

           HERE FOR FOLLOW-UP OF CHRONIC LOW BACK PAIN. THIS IS A WORK

          RELATED INJURY. OVERALL DOING WELL. TAKING CYMBALTA DAILY FOR

          CHRONIC JOINT PAIN. HAS USED TRAMADOL AND SKELAXIN IN THE PAST

          FOR SEVERE PAIN EPISODES BUT HASN'T NEEDED THIS MEDICATION

          RECENTLY.-.

           

      FALL RISK SCREENING:

      SCREENING

           

           

          :NO FALLS REPORTED IN THE LAST YEAR

           

      PAIN SCREENING:

      PATIENT HAS A COMPLAINT OF ACUTE OR CHRONIC PAIN

           

           

          :NO

           

      NURSING NOTE:

           -.

           

      PAIN CENTER INTAKE QUESTIONS:

      DO YOU HAVE A HISTORY OF MRSA?

           

           

          :NO

           

      DO YOU TAKE A BLOOD THINNERS?

           

           

          :NO

           

      DO YOU HAVE ANY BLEEDING DISORDERS?

           

           

          :NO

           

      ANY NEW NUMBNESS OR WEAKNESS IN YOUR LEGS OR ARMS?

           

           

          :NO

           

      ANY PACEMAKER,DEFIBRILLATOR, OR DORSAL COLUMN

          STIMULATOR?

           

           

          :NO

           

      DO YOU HAVE ANY RASHES OR OPEN SORES?

           

           

          :NO

           

      ARE YOU ALLERGIC TO IV DYE?

           

           

          :YES PATIENT IS ALLERGIC TO SULFITES

           

      ARE YOU DIABETIC?

           

           

          :NO

           

      ANY NEW PROBLEMS WITH YOUR MEDICATIONS?

           

           

          :NO

           

      HAVE YOU RECEIVED A VACCINE IN THE PAST 30 DAYS?

           

           

          :NO

           

      DO YOU PLAN TO RECEIVE A VACCINE IN THE NEXT 21

          DAYS?

           

           

          :NO

           

      DO YOU NEED ANY PRESCRIPTION?

           

           

          :NO

           

      DO YOU TAKE ANY IMMUNOSUPPRESSIVE MEDICATIONS?

           

           

          :YES ALLOPURINOL

           

      IS THERE A CHANCE YOU COULD BE PREGNANT?

           

           

          :NO

           

      ARE YOU BREAST FEEDING?

           

           

          :NO

           

CURRENT MEDICATIONS

           

          TAKING ALLOPURINOL 300 MG TABLET 1 TABLET ORALLY ONCE A DAY

          TAKING SINGULAIR 10 MG TABLET 1 TABLET IN THE EVENING ORALLY ONCE

          A DAY

          TAKING VENTOLIN  (90 BASE) MCG/ACT AEROSOL SOLUTION 2

          PUFFS INHALATION AS NEEDED

          TAKING HYDROCHLOROTHIAZIDE 25 25 MG TABLET 1 CAP ORAL DAILY,

          NOTES: CURRENTLY TAKING 1/2 TAB

          TAKING LATANOPROST 0.005 % SOLUTION 1 DROP INTO BOTH EYES EVENING

          OPHTHALMIC ONCE A DAY

          TAKING SYMBICORT 80-4.5 MCG/ACT AEROSOL 2 PUFFS INHALATION TWICE

          A DAY

          TAKING REFRESH CELLUVISC 1 % SOLUTION 1 DROP OU OPHTHALMIC 24

          TIME(S) A DAY

          TAKING CRANBERRY 405 MG CAPSULE ORALLY DAILY

          TAKING FLONASE ALLERGY RELIEF 50 MCG/ACT SUSPENSION 1 SPRAY IN

          EACH NOSTRIL NASALLY ONCE A DAY AS NEEDED

          TAKING OXYBUTYNIN CHLORIDE 5 MG TABLET 1 TABLET ORALLY 2 TIMES A

          DAY

          TAKING POTASSIUM CHLORIDE 10 MEQ CAPSULE EXTENDED RELEASE 1

          CAPSULE WITH FOOD ORALLY ONCE A DAY

          TAKING PROBIOTIC - CAPSULE ORALLY DAILY

          TAKING ZYRTEC ALLERGY 10 MG TABLET 1 TABLET ORALLY ONCE A DAY

          TAKING ALEVE 220 MG TABLET 1 TABLET WITH FOOD OR MILK AS NEEDED

          ORALLY EVERY 12 HRS

          TAKING GABAPENTIN 300 MG CAPSULE 1 CAPSULE ORALLY SPECIAL FUNDS

          TWO TIMES A DAY FOR PAINMDD2

          TAKING CYMBALTA 30 MG CAPSULE DELAYED RELEASE PARTICLES 1 CAPSULE

          ORALLY FOR PAIN TWICE A DAY MDD2

          NOT-TAKING VITAMIN B COMPLEX CAPSULE AS DIRECTED ORALLY DAILY

          NOT-TAKING OMEPRAZOLE 20 MG CAPSULE DELAYED RELEASE 1 CAP ORALLY

          ONCE A DAY AS NEEDED

          NOT-TAKING CABERGOLINE 0.5 MG TABLET 1 TAB ORALLY WEEKLY

          NOT-TAKING TRAMADOL HCL 50 MG TABLET 1 TABLET AS NEEDED ORALLY

          Q8H PRN MDD3

          NOT-TAKING SKELAXIN 800 MG TABLET 1 TABLET ORALLY BID PRN FOR

          SPASMS AND PAIN MDD 2

          NOT-TAKING FIBER 58.6 % POWDER 2 CAPSULES WITH 8 OUNCES OF LIQUID

          ORALLY DAILY

          NOT-TAKING CLINDAMYCIN  MG CAPSULE 1 CAPSULE ORALLY BID

          NOT-TAKING BENTYL 20 MG TABLET 1 TABLET ORALLY EVERY 4 HOURS AS

          NEEDED

          NOT-TAKING BENTYL 10 MG CAPSULE 1 CAPSULE ORALLY TWO TIMES A DAY

          NOT-TAKING XIIDRA 5 % SOLUTION 1 DROP INTO AFFECTED EYE

          OPHTHALMIC TWICE A DAY

          NOT-TAKING PREMARIN 0.625 MG/GM CREAM VAGINAL THREE TIMES A WEEK

          MEDICATION LIST REVIEWED AND RECONCILED WITH THE PATIENT

           

PAST MEDICAL HISTORY

           

          DIABETES

          BLOOD PRESSURE

          ASTHMA

          PITUITARY ADENOMA

          ARTHRITIS

          RUPTURED DISC/BACK PAIN

          IBS WITH DIARRHEA

          OPTIC DAMAGE DUE TO HYPERTENSION

          CHRONIC SINUSITIS

          TICK BITE X2 IN SEPTEMBER - TESTE FOR LYME, AWAITING RESULTS

          19

           

ALLERGIES

           

          PENICILLIN (FOR ALLERGIES USE ONLY): RASH - ALLERGY

          SULFA (FOR ALLERGY USE ONLY): RASH - ALLERGY

          PHENERGAN: BLOOD CLOT - SIDE EFFECTS

          LATEX CONDOMS, RUBBER BANDS, GLOVES: RASH - ALLERGY

          ERYTHROMYCIN: RASH - ALLERGY

          NICKEL: RASH - ALLERGY

          SULFITES: DYSPNEA - ALLERGY

          MILK: GI UPSET, WATERY EYES AND SINUS CONGERSTION - SIDE EFFECTS

          DOXYCYCLINE: HIVES - ALLERGY

           

SURGICAL HISTORY

           

          BTL 

          GB 

          ACL 

          RIGHT ROTATOR CUFF 2015

          LEFT TOTAL KNEE REPLACEMENT 2017

           

FAMILY HISTORY

           

          FATHER: , DIAGNOSED WITH OTHER MALIGNANT NEOPLASM OF

          UNSPECIFIED SITE

          MOTHER: , DIABETES, OTHER SPECIFIED CONDITIONS

          INFLUENCING HEALTH STATUS

          1 SON(S) , 1 DAUGHTER(S) .

          DAD-SKIN CA\NMOM-COPD, PULMONARY FIBROSIS\NSON AND

          DAUGHTER-ASTHMA \NHUSBAND -  SARCOMAANGELA PASSED AWAY

          IN TER - BONE DISORDER.

           

SOCIAL HISTORY

           

          GENERAL:

           

          TOBACCO USE  ARE YOU A: NONSMOKER.

           

           

          LATEX QUESTIONNAIRE

          LATEX ALLERGY : HAVE YOU EVER DEVELOPED ANY TYPE OF REACTION

          AFTER HANDLING LATEX PRODUCTS SUCH AS RUBBER GLOVES, CONDOMS,

          DIAPHRAGMS, BALLOONS, SOCKS, OR UNDERWEAR?YES

          - PLEASE INDICATE :RUBBER GLOVES, CONDOMS, UNDERWEAR

          LATEX ALLERGY : HAVE YOU EVER DEVELOPED ANY TYPE OF REACTION

          DURING OR AFTER DENTAL APPOINTMENT, VAGINAL/RECTAL EXAMINATION,

          SURGICAL PROCEDURE, OR ANY OTHER EXPOSURE?NO

          LATEX RISK : HAVE YOU EVER HAD ANY DIFFICULTY BREATHING OR HIVES

          AFTER EATING OR HANDLING ANY FRUITS, OR VEGETABLES; SUCH AS KIWI,

          BANANAS, STONE FRUITS, OR CHESTNUTSNO

          LATEX RISK : DO YOU HAVE A PREVIOUS PERSONAL HISTORY OF MORE THAN

          NINE SURGERIES, SPINA BIFIDA, OR REPEATED CATHERIZATIONS? NO

          LATEX RISK : ARE YOU FREQUENTLY EXPOSED TO LATEX PRODUCTS IN YOUR

          OCCUPATION?NO

          DATE ASKED : 2020 LATEX ALLERGY

           

           

          LUNG CANCER SCREENING

          SMOKING STATUS:NON SMOKER

           

           

          ALCOHOL SCREENING

          DID YOU HAVE A DRINK CONTAINING ALCOHOL IN THE PAST YEAR?NO

          POINTS0

          INTERPRETATIONNEGATIVE

           

           

          RECREATIONAL DRUG USE

          DRUG USE?NO

           

           

          CAFFEINE

          CAFFEINE USE?YES

          HOW OFTEN AND HOW MUCH? 1-2 CUPS COFFEE/DAY

           

           

          Pentecostalism

          [id number]

           

           

          LANGUAGE

          LANGUAGES SPOKEN:ENGLISH

           

           

          LEARNING BARRIERS / SPECIAL NEEDS

          CHANGE FROM LAST VISIT?NO

          BARRIERS TO LEARNING?NO

          HEARING IMPAIRED?NO

          VISION IMPAIRED?YES ENLARGED OPTIC NERVE RELATED TO GESTATIONAL

          HYPERTENSION.

          COGNITIVELY IMPAIRED?NO

          :CORRECTIVE LENSES

          READINESS TO LEARN?YES

          LEARNING PREFERENCES?NO

          LEARNING CAPABILITIES PRESENT?YES

          EMOTIONAL BARRIERS?NO

          SPECIAL DEVICES?NO

           NEEDED?NO

           

           

          DOMESTIC VIOLENCE

          DO YOU FEEL SAFE IN YOUR ENVIRONMENT?YES

           

           

           

          TODAY'S VISITNOTES 2020

          PATIENT DESCRIBES PAIN AS:ACHING, HAVE IT ALL THE TIME, OTHER

          STIFFNESS

          FROM 0-10, WHAT LEVEL IS YOUR PAIN TODAY?4

           

           

          PAIN CLINIC PFS, CLERGY, PUBLIC HEALTH REFERRALS

          PFS REFERRAL NEEDED?NO

          CLERGY REFERRAL NEEDED?NO

          PUBLIC HEALTH REFERRAL NEEDED?NO

          WAS THE PROVIDER NOTIFIED OF ANY PERTINENT INFO?YES

          HAS THE PATIENT BEEN EDUCATED REGARDING HIS/HER PLAN OF CARE?YES

          HAS THE PATIENT BEEN EDUCATED REGARDING PAIN, THE RISK FOR PAIN,

          THE IMPORTANCE OF EFFECTIVE PAIN MANAGEMENT, AND THE PAIN

          ASSESSMENT PROCESS?YES

           

           

          ADVANCE DIRECTIVE

          ADVANCE DIRECTIVE DISCUSSED WITH PATIENT:YES HCP - LORI MORALES

          [phone]

           

HOSPITALIZATION/MAJOR DIAGNOSTIC PROCEDURE

           

          SURGERY RELATED

          ASTHMA

           

REVIEW OF SYSTEMS

           

      CONSTITUTIONAL:

           

          ANY RECENT FEVER    NO . CHILLS    NO . WEIGHT CHANGE OF UNKNOWN

          REASONS    NO .

           

      GASTROENTEROLOGY:

           

          NEW UNEXPLAINABLE CHANGES IN BOWEL CONTROL    NO . CONSTIPATION  

           NO .

           

      GENITOURINARY:

           

          ANY NEW CHANGE IN BLADDER CONTROL?    NO .

           

      NEUROLOGY:

           

          NEW ONSET DIZZINESS OR NEUROLOGICAL CHANGES NOT MENTIONED    NO .

          NEW NUMBNESS OR PAIN PATTERNS NOT MENTIONED AND PERTINENT TO

          TODAY'S VISIT    NO .

           

      CARDIOLOGY:

           

          NEW CHEST PRESSURE    NO . NEW CHEST PAIN    NO .

           

      RESPIRATORY:

           

          UNEXPLAINABLE COUGH    NO . NEW SHORTNESS OF BREATH    NO .

           

VITAL SIGNS

           

           LBS, HT 65", BMI 33.61 INDEX, /58 MM HG, HR 75 /MIN,

          RR 18 /MIN, TEMP 98.3 F, OXYGEN SAT % 100%, SAFE IN ENV? (Y/N) Y,

          NA INITIALS SC 10:58, REVIEWED BY: HOWARD.

           

EXAMINATION

           

      GENERAL EXAMINATION:

          GENERALNO ACUTE DISTRESS, WELL NOURISHED AND HYDRATED.

           

          PSYCHWEEPY AT TIMES. WILL BE ATTENDING THERAPY. GRIEVING THE LOSS

          OF HER ..

           

          FACE:UNREMARKABLE.

           

ASSESSMENTS

           

          SACROILIITIS, NOT ELSEWHERE CLASSIFIED - M46.1 (PRIMARY)

           

          SPONDYLOSIS OF LUMBAR REGION WITHOUT MYELOPATHY OR RADICULOPATHY

          - M47.816

           

TREATMENT

           

      SACROILIITIS, NOT ELSEWHERE CLASSIFIED

          CONTINUE GABAPENTIN CAPSULE, 300 MG, 1 CAPSULE, ORALLY SPECIAL

          FUNDS, TWO TIMES A DAY FOR PAINMDD2

          CONTINUE CYMBALTA CAPSULE DELAYED RELEASE PARTICLES, 30 MG, 1

          CAPSULE, ORALLY FOR PAIN, TWICE A DAY MDD2

          NOTES: CONTINUE HOME EXERCISE AND STRETCHING. CONTINUE CYMBALTA.

          FOLLOW-UP IS SCHEDULED IN 3 MONTHS. ENCOURAGED TO CALL US SOONER

          SHOULD HER CONDITION CHANGE FOR SOONER APPOINTMENT.

           

PROCEDURES

           

      PN WORKMANS' COMP OPINION

          IN YOUR OPINION, WAS THE INCIDENT THAT THE PATIENT DESCRIBED THE

          COMPETENT MEDICAL CAUSE OF THIS INJURY/ILLNESS?    YES

          ARE THE PATIENT'S COMPLAINTS CONSISTENT WITH HIS/HER HISTORY OF

          THE INJURY/ILLNESS?    YES

          IS THE PATIENT'S HISTORY OF THE INJURY/ILLNESS CONSISTENT WITH

          YOUR OBJECTIVE FINDING?    YES

          WHAT IS THE PERCENTAGE OF TEMPORARY IMPAIRMENT?    MODERATE TO

          MARKED = 66.7%

          IS THE PATIENT WORKING?    YES

          DOCTOR ON SITE:    PEDRO MILLER MD

           

PROCEDURE CODES

           

           ESTABILISHED PATIENT [facility] CHARGE

           

DISPOSITION & COMMUNICATION

           

FOLLOW UP

           

          3 MONTHS (REASON: WORKMEN'S COMP/LOW BACK PAIN/MEDICATION

          MANAGEMENT)

           

 

ELECTRONICALLY SIGNED BY LUPILLO JERONIMO ON

          2021 AT 11:11 AM EST

           

           

           

 

DISCLAIMER :

THIS IS A VISIT SUMMARY EXTRACTED FROM THE [employer] CHART.

IT IS NOT A COPY OF THE [employer] PROGRESS NOTE.

GEORGED

## 2021-04-09 ENCOUNTER — HOSPITAL ENCOUNTER (OUTPATIENT)
Dept: HOSPITAL 53 - M PAIN | Age: 66
End: 2021-04-09
Attending: NURSE PRACTITIONER
Payer: COMMERCIAL

## 2021-04-09 DIAGNOSIS — Z88.0: ICD-10-CM

## 2021-04-09 DIAGNOSIS — Z91.09: ICD-10-CM

## 2021-04-09 DIAGNOSIS — Z88.1: ICD-10-CM

## 2021-04-09 DIAGNOSIS — Z79.899: ICD-10-CM

## 2021-04-09 DIAGNOSIS — M46.1: Primary | ICD-10-CM

## 2021-04-09 DIAGNOSIS — J45.909: ICD-10-CM

## 2021-04-09 DIAGNOSIS — Z88.2: ICD-10-CM

## 2021-04-09 DIAGNOSIS — Z91.040: ICD-10-CM

## 2021-04-09 DIAGNOSIS — G89.29: ICD-10-CM

## 2021-04-09 DIAGNOSIS — Z88.8: ICD-10-CM

## 2021-04-09 DIAGNOSIS — Z91.011: ICD-10-CM

## 2021-04-09 DIAGNOSIS — M47.816: ICD-10-CM

## 2021-04-13 NOTE — ECWPNPC
PATIENT NAME: JERRY MILLER

: 1955

GENDER: FEMALE

MRN: [id number]

VISIT DATE: 2021

DISCHARGE DATE: 21 1057

VISIT LOCKED DATE TIME: 

PHYSICIAN: MIKE LIZAMA

RESOURCE: MIKE LIZAMA

 

           

           

REASON FOR APPOINTMENT

           

          1. W/C LOW BACK.

           

HISTORY OF PRESENT ILLNESS

           

      GENERAL:

           - - HERE FOR FOLLOW-UP OF CHRONIC LOW BACK PAIN. THIS IS A WORK

          RELATED INJURY. OVERALL DOING WELL. TAKING CYMBALTA DAILY FOR

          CHRONIC JOINT PAIN. HAS USED TRAMADOL AND SKELAXIN IN THE PAST

          FOR SEVERE PAIN EPISODES BUT HASN'T NEEDED THIS MEDICATION

          RECENTLY.SHE IS OFF GABAPENTIN AND HAS BEEN WEANING DOWN AND WILL

          DISCONTINUE CYMBALTA SOON.-.

           

      FALL RISK SCREENING:

      SCREENING

          : NO FALLS REPORTED IN THE LAST YEAR , : NO FALLS REPORTED IN THE

          LAST YEAR.

           

      PAIN SCREENING:

      PATIENT HAS A COMPLAINT OF ACUTE OR CHRONIC PAIN

           

           

          :YES

          LOCATION OF PAIN:LOW BACK

          INTENSITY OF PAIN (SCALE OF 1 TO 10):5

          WHAT DOES YOUR PAIN FEEL LIKE:ACHING

          DURATION:MAINLY DURING THE DAY, INTERMITTENT

          PAIN IS INCREASED BY:OTHERS SLEEPING

          PAIN IS DECREASED BY:OTHERS MOVING AROUND HELPS

           

      NURSING NOTE:

           - -.

           

      PAIN CENTER INTAKE QUESTIONS:

      DO YOU HAVE A HISTORY OF MRSA?

           

           

          :NO

           

      DO YOU TAKE A BLOOD THINNERS?

           

           

          :NO

           

      DO YOU HAVE ANY BLEEDING DISORDERS?

           

           

          :NO

           

      ANY NEW NUMBNESS OR WEAKNESS IN YOUR LEGS OR ARMS?

           

           

          :NO

           

      ANY PACEMAKER,DEFIBRILLATOR, OR DORSAL COLUMN

          STIMULATOR?

           

           

          :NO

           

      DO YOU HAVE ANY RASHES OR OPEN SORES?

           

           

          :NO

           

      ARE YOU ALLERGIC TO IV DYE?

           

           

          :YES PATIENT IS ALLERGIC TO SULFITES

           

      ARE YOU DIABETIC?

           

           

          :NO

           

      ANY NEW PROBLEMS WITH YOUR MEDICATIONS?

           

           

          :NO

           

      HAVE YOU RECEIVED A VACCINE IN THE PAST 30 DAYS?

           

           

          :NO

           

      DO YOU PLAN TO RECEIVE A VACCINE IN THE NEXT 21

          DAYS?

           

           

          :NO

           

      DO YOU NEED ANY PRESCRIPTION?

           

           

          :NO

           

      DO YOU TAKE ANY IMMUNOSUPPRESSIVE MEDICATIONS?

           

           

          :YES ALLOPURINOL

           

      IS THERE A CHANCE YOU COULD BE PREGNANT?

           

           

          :NO

           

      ARE YOU BREAST FEEDING?

           

           

          :NO

           

CURRENT MEDICATIONS

           

          TAKING ALLOPURINOL 300 MG TABLET 1 TABLET ORALLY ONCE A DAY

          TAKING SINGULAIR 10 MG TABLET 1 TABLET IN THE EVENING ORALLY ONCE

          A DAY

          TAKING VENTOLIN  (90 BASE) MCG/ACT AEROSOL SOLUTION 2

          PUFFS INHALATION AS NEEDED

          TAKING HYDROCHLOROTHIAZIDE 25 25 MG TABLET 1 CAP ORAL DAILY,

          NOTES: CURRENTLY TAKING 1/2 TAB

          TAKING SYMBICORT 80-4.5 MCG/ACT AEROSOL 2 PUFFS INHALATION TWICE

          A DAY

          TAKING REFRESH CELLUVISC 1 % SOLUTION 1 DROP OU OPHTHALMIC 24

          TIME(S) A DAY

          TAKING CRANBERRY 405 MG CAPSULE ORALLY DAILY

          TAKING FLONASE ALLERGY RELIEF 50 MCG/ACT SUSPENSION 1 SPRAY IN

          EACH NOSTRIL NASALLY ONCE A DAY AS NEEDED

          TAKING OXYBUTYNIN CHLORIDE 5 MG TABLET 1 TABLET ORALLY 2 TIMES A

          DAY

          TAKING POTASSIUM CHLORIDE 10 MEQ CAPSULE EXTENDED RELEASE 1

          CAPSULE WITH FOOD ORALLY ONCE A DAY

          TAKING PROBIOTIC - CAPSULE ORALLY DAILY

          TAKING ALEVE 220 MG TABLET 1 TABLET WITH FOOD OR MILK AS NEEDED

          ORALLY EVERY 12 HRS

          TAKING GABAPENTIN 300 MG CAPSULE 1 CAPSULE ORALLY [facility]

          TWO TIMES A DAY FOR PAINMDD2

          TAKING XIIDRA 5 % SOLUTION 1 DROP INTO AFFECTED EYE OPHTHALMIC

          TWICE A DAY

          NOT-TAKING LATANOPROST 0.005 % SOLUTION 1 DROP INTO BOTH EYES

          EVENING OPHTHALMIC ONCE A DAY

          NOT-TAKING ZYRTEC ALLERGY 10 MG TABLET 1 TABLET ORALLY ONCE A DAY

          NOT-TAKING CYMBALTA 30 MG CAPSULE DELAYED RELEASE PARTICLES 1

          CAPSULE ORALLY FOR PAIN TWICE A DAY MDD2

          NOT-TAKING VITAMIN B COMPLEX CAPSULE AS DIRECTED ORALLY DAILY

          NOT-TAKING OMEPRAZOLE 20 MG CAPSULE DELAYED RELEASE 1 CAP ORALLY

          ONCE A DAY AS NEEDED

          NOT-TAKING CABERGOLINE 0.5 MG TABLET 1 TAB ORALLY WEEKLY

          NOT-TAKING TRAMADOL HCL 50 MG TABLET 1 TABLET AS NEEDED ORALLY

          Q8H PRN MDD3

          NOT-TAKING SKELAXIN 800 MG TABLET 1 TABLET ORALLY BID PRN FOR

          SPASMS AND PAIN MDD 2

          NOT-TAKING FIBER 58.6 % POWDER 2 CAPSULES WITH 8 OUNCES OF LIQUID

          ORALLY DAILY

          NOT-TAKING CLINDAMYCIN  MG CAPSULE 1 CAPSULE ORALLY BID

          NOT-TAKING BENTYL 20 MG TABLET 1 TABLET ORALLY EVERY 4 HOURS AS

          NEEDED

          NOT-TAKING BENTYL 10 MG CAPSULE 1 CAPSULE ORALLY TWO TIMES A DAY

          NOT-TAKING XIIDRA 5 % SOLUTION 1 DROP INTO AFFECTED EYE

          OPHTHALMIC TWICE A DAY

          NOT-TAKING PREMARIN 0.625 MG/GM CREAM VAGINAL THREE TIMES A WEEK

          MEDICATION LIST REVIEWED AND RECONCILED WITH THE PATIENT

           

PAST MEDICAL HISTORY

           

          DIABETES

          BLOOD PRESSURE

          ASTHMA

          PITUITARY ADENOMA

          ARTHRITIS

          RUPTURED DISC/BACK PAIN

          IBS WITH DIARRHEA

          OPTIC DAMAGE DUE TO HYPERTENSION

          CHRONIC SINUSITIS

          TICK BITE X2 IN SEPTEMBER - TESTE FOR LYME, AWAITING RESULTS

          19

           

ALLERGIES

           

          PENICILLIN (FOR ALLERGIES USE ONLY): RASH - ALLERGY

          SULFA (FOR ALLERGY USE ONLY): RASH - ALLERGY

          PHENERGAN: BLOOD CLOT - SIDE EFFECTS

          LATEX CONDOMS, RUBBER BANDS, GLOVES: RASH - ALLERGY

          ERYTHROMYCIN: RASH - ALLERGY

          NICKEL: RASH - ALLERGY

          SULFITES: DYSPNEA - ALLERGY

          MILK: GI UPSET, WATERY EYES AND SINUS CONGERSTION - SIDE EFFECTS

          DOXYCYCLINE: HIVES - ALLERGY

           

SOCIAL HISTORY

           

          GENERAL:

           

          TOBACCO USE  ARE YOU A: NONSMOKER.

           

           

          LATEX QUESTIONNAIRE

          LATEX ALLERGY : HAVE YOU EVER DEVELOPED ANY TYPE OF REACTION

          AFTER HANDLING LATEX PRODUCTS SUCH AS RUBBER GLOVES, CONDOMS,

          DIAPHRAGMS, BALLOONS, SOCKS, OR UNDERWEAR?YES

          - PLEASE INDICATE :RUBBER GLOVES, CONDOMS, UNDERWEAR

          LATEX ALLERGY : HAVE YOU EVER DEVELOPED ANY TYPE OF REACTION

          DURING OR AFTER DENTAL APPOINTMENT, VAGINAL/RECTAL EXAMINATION,

          SURGICAL PROCEDURE, OR ANY OTHER EXPOSURE?NO

          LATEX RISK : HAVE YOU EVER HAD ANY DIFFICULTY BREATHING OR HIVES

          AFTER EATING OR HANDLING ANY FRUITS, OR VEGETABLES; SUCH AS KIWI,

          BANANAS, STONE FRUITS, OR CHESTNUTSNO

          LATEX RISK : DO YOU HAVE A PREVIOUS PERSONAL HISTORY OF MORE THAN

          NINE SURGERIES, SPINA BIFIDA, OR REPEATED CATHERIZATIONS? NO

          LATEX RISK : ARE YOU FREQUENTLY EXPOSED TO LATEX PRODUCTS IN YOUR

          OCCUPATION?NO

          DATE ASKED : 2021 LATEX ALLERGY

           

           

          ALCOHOL USE: NO.

           

           

          LUNG CANCER SCREENING

          SMOKING STATUS:NON SMOKER

           

           

          ALCOHOL SCREENING

          DID YOU HAVE A DRINK CONTAINING ALCOHOL IN THE PAST YEAR?NO

          POINTS0

          INTERPRETATIONNEGATIVE

           

           

          RECREATIONAL DRUG USE

          DRUG USE?NO

           

           

          CAFFEINE

          CAFFEINE USE?YES

          HOW OFTEN AND HOW MUCH? 1-2 CUPS COFFEE/DAY

           

           

          Jew

          [id number] Bahai

           

           

          LANGUAGE

          LANGUAGES SPOKEN:ENGLISH

           

           

          LEARNING BARRIERS / SPECIAL NEEDS

          CHANGE FROM LAST VISIT?NO

          BARRIERS TO LEARNING?NO

          HEARING IMPAIRED?NO

          VISION IMPAIRED?YES ENLARGED OPTIC NERVE RELATED TO GESTATIONAL

          HYPERTENSION.

          :CORRECTIVE LENSES

          COGNITIVELY IMPAIRED?NO

          READINESS TO LEARN?YES

          LEARNING PREFERENCES?NO

          LEARNING CAPABILITIES PRESENT?YES

          EMOTIONAL BARRIERS?NO

          SPECIAL DEVICES?NO

           NEEDED?NO

           

           

          DOMESTIC VIOLENCE

          DO YOU FEEL SAFE IN YOUR ENVIRONMENT?YES

           

           

           

          TODAY'S VISITNOTES 2020

          PATIENT DESCRIBES PAIN AS:ACHING, HAVE IT ALL THE TIME, OTHER

          STIFFNESS

          FROM 0-10, WHAT LEVEL IS YOUR PAIN TODAY?4

           

           

          -

          PFS REFERRAL NEEDED?NO

          CLERGY REFERRAL NEEDED?NO

          PUBLIC HEALTH REFERRAL NEEDED?NO

          WAS THE PROVIDER NOTIFIED OF ANY PERTINENT INFO?YES

          HAS THE PATIENT BEEN EDUCATED REGARDING HIS/HER PLAN OF CARE?YES

          HAS THE PATIENT BEEN EDUCATED REGARDING PAIN, THE RISK FOR PAIN,

          THE IMPORTANCE OF EFFECTIVE PAIN MANAGEMENT, AND THE PAIN

          ASSESSMENT PROCESS?YES

           

           

          ADVANCE DIRECTIVE

          ADVANCE DIRECTIVE DISCUSSED WITH PATIENT:YES HCP - LORI MORALES

          [phone]

           

REVIEW OF SYSTEMS

           

      CONSTITUTIONAL:

           

          ANY RECENT FEVER    NO . CHILLS    NO . WEIGHT CHANGE OF UNKNOWN

          REASONS    NO .

           

      GASTROENTEROLOGY:

           

          NEW UNEXPLAINABLE CHANGES IN BOWEL CONTROL    NO . CONSTIPATION  

           NO .

           

      GENITOURINARY:

           

          ANY NEW CHANGE IN BLADDER CONTROL?    NO .

           

      NEUROLOGY:

           

          NEW ONSET DIZZINESS OR NEUROLOGICAL CHANGES NOT MENTIONED    NO .

          NEW NUMBNESS OR PAIN PATTERNS NOT MENTIONED AND PERTINENT TO

          TODAY'S VISIT    NO .

           

      CARDIOLOGY:

           

          NEW CHEST PRESSURE    NO . PATIENT DENIES    NO .

           

      RESPIRATORY:

           

          UNEXPLAINABLE COUGH    NO . NEW SHORTNESS OF BREATH    NO .

           

VITAL SIGNS

           

          .8 LBS, HT 65", BMI 34.24 INDEX, /63 MM HG, HR 74

          /MIN, RR 18 /MIN, TEMP 98.1 F, OXYGEN SAT % 100%, SAFE IN ENV?

          (Y/N) YES, NA INITIALS AW [id number] MA.

           

EXAMINATION

           

      GENERAL EXAMINATION:

          GENERALNO ACUTE DISTRESS, WELL NOURISHED AND HYDRATED.

           

          PSYCHWEEPY AT TIMES. WILL BE ATTENDING THERAPY. GRIEVING THE LOSS

          OF HER ..

           

          FACE:UNREMARKABLE.

           

ASSESSMENTS

           

          SACROILIITIS, NOT ELSEWHERE CLASSIFIED - M46.1 (PRIMARY)

           

          SPONDYLOSIS OF LUMBAR REGION WITHOUT MYELOPATHY OR RADICULOPATHY

          - M47.816

           

TREATMENT

           

      SACROILIITIS, NOT ELSEWHERE CLASSIFIED

          NOTES: ADVISED TO TAKE CYMBALTA 30MG EVERY OTHER DAY X10 DAYS

          THEN DISCONTINUE.CONTINUE HOME EXCERSISE AND STRETCHING.

           

PROCEDURES

           

      PN WORKMANS' COMP OPINION

          IN YOUR OPINION, WAS THE INCIDENT THAT THE PATIENT DESCRIBED THE

          COMPETENT MEDICAL CAUSE OF THIS INJURY/ILLNESS?    YES

          ARE THE PATIENT'S COMPLAINTS CONSISTENT WITH HIS/HER HISTORY OF

          THE INJURY/ILLNESS?    YES

          IS THE PATIENT'S HISTORY OF THE INJURY/ILLNESS CONSISTENT WITH

          YOUR OBJECTIVE FINDING?    YES

          WHAT IS THE PERCENTAGE OF TEMPORARY IMPAIRMENT?    MODERATE TO

          MARKED = 66.7%

          IS THE PATIENT WORKING?    YES

          DOCTOR ON SITE:    PEDRO MILLER MD

           

PROCEDURE CODES

           

           ESTABILISHED PATIENT [facility] FACILITY CHARGE

           

DISPOSITION & COMMUNICATION

           

FOLLOW UP

           

          3 MONTHS (REASON: W/C LBP RESPONDS WELL TO RF)

           

 

ELECTRONICALLY SIGNED BY LUPILLO JERONIMO ON

          2021 AT 01:16 PM EDT

           

           

           

 

DISCLAIMER :

THIS IS A VISIT SUMMARY EXTRACTED FROM THE [employer] CHART.

IT IS NOT A COPY OF THE [facility].

MTDD

## 2021-07-07 ENCOUNTER — HOSPITAL ENCOUNTER (OUTPATIENT)
Dept: HOSPITAL 53 - M PAIN | Age: 66
End: 2021-07-07
Attending: NURSE PRACTITIONER
Payer: COMMERCIAL

## 2021-07-07 DIAGNOSIS — G89.29: ICD-10-CM

## 2021-07-07 DIAGNOSIS — Z88.2: ICD-10-CM

## 2021-07-07 DIAGNOSIS — M47.816: Primary | ICD-10-CM

## 2021-07-07 DIAGNOSIS — Z88.8: ICD-10-CM

## 2021-07-07 DIAGNOSIS — Z88.1: ICD-10-CM

## 2021-07-07 DIAGNOSIS — Z88.0: ICD-10-CM

## 2021-07-07 DIAGNOSIS — Z91.011: ICD-10-CM

## 2021-07-07 DIAGNOSIS — Z79.899: ICD-10-CM

## 2021-07-07 DIAGNOSIS — J45.909: ICD-10-CM

## 2021-07-07 DIAGNOSIS — Z91.040: ICD-10-CM

## 2022-04-06 ENCOUNTER — HOSPITAL ENCOUNTER (OUTPATIENT)
Dept: HOSPITAL 53 - M PAIN | Age: 67
End: 2022-04-06
Attending: ANESTHESIOLOGY
Payer: COMMERCIAL

## 2022-04-06 DIAGNOSIS — Z88.0: ICD-10-CM

## 2022-04-06 DIAGNOSIS — Z79.899: ICD-10-CM

## 2022-04-06 DIAGNOSIS — Z91.011: ICD-10-CM

## 2022-04-06 DIAGNOSIS — Z91.040: ICD-10-CM

## 2022-04-06 DIAGNOSIS — Z88.2: ICD-10-CM

## 2022-04-06 DIAGNOSIS — J45.909: ICD-10-CM

## 2022-04-06 DIAGNOSIS — G89.29: ICD-10-CM

## 2022-04-06 DIAGNOSIS — Z88.8: ICD-10-CM

## 2022-04-06 DIAGNOSIS — M54.50: Primary | ICD-10-CM

## 2022-04-06 DIAGNOSIS — Z88.1: ICD-10-CM

## 2022-04-06 DIAGNOSIS — M53.3: ICD-10-CM

## 2024-06-12 ENCOUNTER — HOSPITAL ENCOUNTER (OUTPATIENT)
Dept: HOSPITAL 53 - M WUC | Age: 69
End: 2024-06-12
Attending: PHYSICIAN ASSISTANT
Payer: COMMERCIAL

## 2024-06-12 DIAGNOSIS — M17.11: ICD-10-CM

## 2024-06-12 DIAGNOSIS — M25.561: Primary | ICD-10-CM

## 2024-06-25 ENCOUNTER — HOSPITAL ENCOUNTER (OUTPATIENT)
Dept: HOSPITAL 53 - M WUC | Age: 69
End: 2024-06-25
Attending: PHYSICIAN ASSISTANT
Payer: MEDICARE

## 2024-06-25 DIAGNOSIS — I10: Primary | ICD-10-CM

## 2024-06-25 DIAGNOSIS — R73.01: ICD-10-CM

## 2024-06-25 DIAGNOSIS — E78.5: ICD-10-CM

## 2024-06-25 LAB
ALBUMIN SERPL BCG-MCNC: 3.9 G/DL (ref 3.2–5.2)
ALP SERPL-CCNC: 84 U/L (ref 46–116)
ALT SERPL W P-5'-P-CCNC: 19 U/L (ref 7–40)
AST SERPL-CCNC: 14 U/L (ref ?–34)
BILIRUB SERPL-MCNC: 0.5 MG/DL (ref 0.3–1.2)
BUN SERPL-MCNC: 14 MG/DL (ref 9–23)
CALCIUM SERPL-MCNC: 10 MG/DL (ref 8.3–10.6)
CHLORIDE SERPL-SCNC: 105 MMOL/L (ref 98–107)
CHOLEST SERPL-MCNC: 173 MG/DL (ref ?–200)
CHOLEST/HDLC SERPL: 3.3 {RATIO} (ref ?–5)
CO2 SERPL-SCNC: 29 MMOL/L (ref 20–31)
CREAT SERPL-MCNC: 0.81 MG/DL (ref 0.55–1.3)
EST. AVERAGE GLUCOSE BLD GHB EST-MCNC: 114 MG/DL (ref 60–110)
GFR SERPL CREATININE-BSD FRML MDRD: > 60 ML/MIN/{1.73_M2} (ref 45–?)
GLUCOSE SERPL-MCNC: 111 MG/DL (ref 74–106)
HCT VFR BLD AUTO: 44.7 % (ref 36–47)
HDLC SERPL-MCNC: 52.4 MG/DL (ref 40–?)
HGB BLD-MCNC: 14.7 G/DL (ref 12–15.5)
LDLC SERPL CALC-MCNC: 102.4 MG/DL (ref ?–100)
MCH RBC QN AUTO: 31.5 PG (ref 27–33)
MCHC RBC AUTO-ENTMCNC: 32.9 G/DL (ref 32–36.5)
MCV RBC AUTO: 95.9 FL (ref 80–96)
NONHDLC SERPL-MCNC: 120.6 MG/DL
PLATELET # BLD AUTO: 418 10^3/UL (ref 150–450)
POTASSIUM SERPL-SCNC: 3.8 MMOL/L (ref 3.5–5.1)
PROT SERPL-MCNC: 6.7 G/DL (ref 5.7–8.2)
RBC # BLD AUTO: 4.66 10^6/UL (ref 4–5.4)
SODIUM SERPL-SCNC: 141 MMOL/L (ref 136–145)
TRIGL SERPL-MCNC: 91 MG/DL (ref ?–150)
TSH SERPL DL<=0.005 MIU/L-ACNC: 1.58 UIU/ML (ref 0.55–4.78)

## 2024-06-26 LAB — WBC # BLD AUTO: 5.8 10^3/UL (ref 4–10)
